# Patient Record
Sex: MALE | Employment: OTHER | URBAN - METROPOLITAN AREA
[De-identification: names, ages, dates, MRNs, and addresses within clinical notes are randomized per-mention and may not be internally consistent; named-entity substitution may affect disease eponyms.]

---

## 2017-07-20 ENCOUNTER — TRANSFERRED RECORDS (OUTPATIENT)
Dept: HEALTH INFORMATION MANAGEMENT | Facility: CLINIC | Age: 82
End: 2017-07-20

## 2017-08-23 ENCOUNTER — OFFICE VISIT (OUTPATIENT)
Dept: UROLOGY | Facility: CLINIC | Age: 82
End: 2017-08-23
Payer: MEDICARE

## 2017-08-23 VITALS
SYSTOLIC BLOOD PRESSURE: 92 MMHG | HEART RATE: 80 BPM | WEIGHT: 176 LBS | BODY MASS INDEX: 24.64 KG/M2 | HEIGHT: 71 IN | DIASTOLIC BLOOD PRESSURE: 44 MMHG

## 2017-08-23 DIAGNOSIS — R31.9 HEMATURIA: Primary | ICD-10-CM

## 2017-08-23 LAB
ALBUMIN UR-MCNC: 100 MG/DL
APPEARANCE UR: ABNORMAL
BILIRUB UR QL STRIP: ABNORMAL
COLOR UR AUTO: ABNORMAL
GLUCOSE UR STRIP-MCNC: NEGATIVE MG/DL
HGB UR QL STRIP: ABNORMAL
KETONES UR STRIP-MCNC: NEGATIVE MG/DL
LEUKOCYTE ESTERASE UR QL STRIP: NEGATIVE
NITRATE UR QL: NEGATIVE
PH UR STRIP: 6.5 PH (ref 5–7)
RESIDUAL VOLUME (RV) (EXTERNAL): 500
SOURCE: ABNORMAL
SP GR UR STRIP: 1.01 (ref 1–1.03)
UROBILINOGEN UR STRIP-ACNC: 0.2 EU/DL (ref 0.2–1)

## 2017-08-23 PROCEDURE — 51798 US URINE CAPACITY MEASURE: CPT | Performed by: UROLOGY

## 2017-08-23 PROCEDURE — 81003 URINALYSIS AUTO W/O SCOPE: CPT | Performed by: UROLOGY

## 2017-08-23 PROCEDURE — 51701 INSERT BLADDER CATHETER: CPT | Performed by: UROLOGY

## 2017-08-23 PROCEDURE — 99203 OFFICE O/P NEW LOW 30 MIN: CPT | Mod: 25 | Performed by: UROLOGY

## 2017-08-23 RX ORDER — AMPICILLIN TRIHYDRATE 500 MG
CAPSULE ORAL DAILY
COMMUNITY
Start: 2009-06-11

## 2017-08-23 RX ORDER — CALCIUM CARBONATE 500(1250)
1 TABLET ORAL 2 TIMES DAILY
COMMUNITY
End: 2017-09-06

## 2017-08-23 RX ORDER — IBUPROFEN 200 MG
200 TABLET ORAL EVERY 6 HOURS PRN
Status: ON HOLD | COMMUNITY
Start: 2008-05-22 | End: 2017-09-07

## 2017-08-23 RX ORDER — PRAVASTATIN SODIUM 10 MG
10 TABLET ORAL AT BEDTIME
Refills: 2 | COMMUNITY
Start: 2017-08-15

## 2017-08-23 ASSESSMENT — PAIN SCALES - GENERAL: PAINLEVEL: NO PAIN (0)

## 2017-08-23 NOTE — NURSING NOTE
Chief Complaint   Patient presents with     Hematuria     Patient is here for blood in his urine that he can see.     Freddie Parrish LPN 4:00 PM August 23, 2017

## 2017-08-23 NOTE — MR AVS SNAPSHOT
After Visit Summary   8/23/2017    Mio Delacruz    MRN: [id number]           Patient Information     Date Of Birth          1/4/1927        Visit Information        Provider Department      8/23/2017 3:50 PM Agusto Iverson MD [facility] Urology [facility]        Today's Diagnoses     Hematuria    -  1       Follow-ups after your visit        Your next 10 appointments already scheduled     Aug 25, 2017 11:30 AM CDT   CT ABDOMEN PELVIS W/O & W CONTRAST with SHCT1   [facility] ([facility])    [address] [place] [address]   [phone]           Please bring any scans or X-rays taken at other hospitals, if similar tests were done. Also bring a list of your medicines, including vitamins, minerals and over-the-counter drugs. It is safest to leave personal items at home.  Be sure to tell your doctor:   If you have any allergies.   If there s any chance you are pregnant.   If you are breastfeeding.   If you have any special needs.  You may have contrast for this exam. To prepare:   Do not eat or drink for 2 hours before your exam. If you need to take medicine, you may take it with small sips of water. (We may ask you to take liquid medicine as well.)   The day before your exam, drink extra fluids at least six 8-ounce glasses (unless your doctor tells you to restrict your fluids).  Patients over 70 or patients with diabetes or kidney problems:   If you haven t had a blood test (creatinine test) within the last 30 days, go to your clinic or Diagnostic Imaging Department for this test.  If you have diabetes:   If your kidney function is normal, continue taking your metformin (Avandamet, Glucophage, Glucovance, Metaglip) on the day of your exam.   If your kidney function is abnormal, wait 48 hours before restarting this medicine.  You will have oral contrast for this exam:   You will drink the contrast at home. Get this from your  "clinic or Diagnostic Imaging Department. Please follow the directions given.  Please wear loose clothing, such as a sweat suit or jogging clothes. Avoid snaps, zippers and other metal. We may ask you to undress and put on a hospital gown.  If you have any questions, please call the Imaging Department where you will have your exam.            Sep 06, 2017 11:00 AM CDT   Cystoscopy with Agusto Iverson MD, UB CYF   [facility] ([facility])    [address]  [address]  [facility] [address]   [phone]              Future tests that were ordered for you today     Open Future Orders        Priority Expected Expires Ordered    Creatinine [LAB66] Routine  8/23/2018 8/23/2017    CT Abdomen Pelvis w/o & w Contrast [BSY731] Routine  8/23/2018 8/23/2017            Who to contact     If you have questions or need follow up information about today's clinic visit or your schedule please contact [facility]OLOG[facility] CLIN[facility] [place] directly at [phone].  Normal or non-critical lab and imaging results will be communicated to you by Iscopia Softwarehart, letter or phone within 4 business days after the clinic has received the results. If you do not hear from us within 7 days, please contact the clinic through [employer] or phone. If you have a critical or abnormal lab result, we will notify you by phone as soon as possible.  Submit refill requests through ALKALINE WATER or call your pharmacy and they will forward the refill request to us. Please allow 3 business days for your refill to be completed.          Additional Information About Your Visit        ALKALINE WATER Information     ALKALINE WATER lets you send messages to your doctor, view your test results, renew your prescriptions, schedule appointments and more. To sign up, go to [email or web address] . Click on \"Log in\" on the left side of the screen, which will take you to the Welcome page. Then click on \"Sign up Now\" on the " "right side of the page.     You will be asked to enter the access code listed below, as well as some personal information. Please follow the directions to create your username and password.     Your access code is: [email or web address]  Expires: 2017  4:45 PM     Your access code will  in 90 days. If you need help or a new code, please call your [facility] or [phone].        Care EveryWhere ID     This is your Care EveryWhere ID. This could be used by other organizations to access your [place] medical records  [id number]        Your Vitals Were     Pulse Height BMI (Body Mass Index)             80 1.803 m (5' 11\") 24.55 kg/m2          Blood Pressure from Last 3 Encounters:   17 92/44    Weight from Last 3 Encounters:   17 79.8 kg (176 lb)              We Performed the Following     INSERT BLADDER CATH, TEMP INDWELL SIMPLE ([place]) (54270)     MEASURE POST-VOID RESIDUAL URINE/BLADDER CAPACITY, US NON-IMAGING     UA without Microscopic        Primary Care Provider    None Specified       No primary provider on file.        Equal Access to Services     [facility]: Hadii jalen Chapman, wabud farley, qaybjyothi quezadaalaristeo packer, lenore hdz . So [facility] [phone].    ATENCIÓN: Si habla español, tiene a jackson disposición servicios gratuitos de asistencia lingüística. Llame al [phone].    We comply with applicable federal civil rights laws and [place] laws. We do not discriminate on the basis of race, color, national origin, age, disability sex, sexual orientation or gender identity.            Thank you!     Thank you for choosing [facility] [place]  for your care. Our goal is always to provide you with excellent care. Hearing back from our patients is one way we can continue to improve our services. Please take a few minutes to complete the written survey that you may receive in the mail after your visit with us. " Thank you!             Your Updated Medication List - Protect others around you: Learn how to safely use, store and throw away your medicines at www.disposemymeds.org.          This list is accurate as of: 8/23/17  4:56 PM.  Always use your most recent med list.                   Brand Name Dispense Instructions for use Diagnosis    calcium carbonate 1250 MG tablet    OS-LEVY 500 mg [place]. Ca     Take 1 tablet by mouth 2 times daily        D 1000 1000 UNITS Caps           ibuprofen 200 MG tablet    ADVIL/MOTRIN          pravastatin 10 MG tablet    PRAVACHOL

## 2017-08-23 NOTE — LETTER
8/23/2017       RE: Mio Delacruz  [address]  [place] [address]     Dear Colleague,    Thank you for referring your patient, Mio Delacruz, to the [facility]OLOG[facility] [place] at [facility]. Please see a copy of my visit note below.    Mio Delacruz is a 89-year-old gentleman who underwent radiation therapy for prostate cancer 10 years ago. I have not seen him in the last 6 years. Apparently his PSA is less than 0.5 on a regular basis at [facility]. He began having hematuria in the last 24 hours and noticed a few small clots passed. He feels he is emptying his bladder well. He's had no flank pain, fevers, chills, dysuria.  Postvoid residual today is 600 cc with Rosales catheter placement. His urine is grossly bloody  Other past medical history: Nonsmoker, TUNA procedure for BPH.  Medications: Calcium carbonate, vitamin D, ibuprofen, Pravachol  Allergies penicillin  Exam: Normal appearance, normal vital signs, alert and oriented, normocephalic, normal respirations, neuro grossly intact.  22 Danish Rosales placed sterilely and bladder emptied for 600 cc of grossly bloody urine. Bladder irrigated with no clots. Rosales catheter removed.  Assessment: Gross hematuria, radiation therapy for prostate cancer, partial urinary retention  Plan: CT scan, serum creatinine, office cystoscopy, digital rectal exam. Stop ibuprofen. No aspirin    Again, thank you for allowing me to participate in the care of your patient.      Sincerely,    Agusto Iverson MD

## 2017-08-23 NOTE — PROGRESS NOTES
Mio Delacruz is a 89-year-old gentleman who underwent radiation therapy for prostate cancer 10 years ago. I have not seen him in the last 6 years. Apparently his PSA is less than 0.5 on a regular basis at [facility]. He began having hematuria in the last 24 hours and noticed a few small clots passed. He feels he is emptying his bladder well. He's had no flank pain, fevers, chills, dysuria.  Postvoid residual today is 600 cc with Rosales catheter placement. His urine is grossly bloody  Other past medical history: Nonsmoker, TUNA procedure for BPH.  Medications: Calcium carbonate, vitamin D, ibuprofen, Pravachol  Allergies penicillin  Exam: Normal appearance, normal vital signs, alert and oriented, normocephalic, normal respirations, neuro grossly intact.  22 Croatian Rosales placed sterilely and bladder emptied for 600 cc of grossly bloody urine. Bladder irrigated with no clots. Rosales catheter removed.  Assessment: Gross hematuria, radiation therapy for prostate cancer, partial urinary retention  Plan: CT scan, serum creatinine, office cystoscopy, digital rectal exam. Stop ibuprofen. No aspirin

## 2017-08-23 NOTE — LETTER
8/23/2017      RE: Mio Delacruz  [address]  [place] [address]       Mio Delacruz is a 89-year-old gentleman who underwent radiation therapy for prostate cancer 10 years ago. I have not seen him in the last 6 years. Apparently his PSA is less than 0.5 on a regular basis at [facility]. He began having hematuria in the last 24 hours and noticed a few small clots passed. He feels he is emptying his bladder well. He's had no flank pain, fevers, chills, dysuria.  Postvoid residual today is 600 cc with Rosales catheter placement. His urine is grossly bloody  Other past medical history: Nonsmoker, TUNA procedure for BPH.  Medications: Calcium carbonate, vitamin D, ibuprofen, Pravachol  Allergies penicillin  Exam: Normal appearance, normal vital signs, alert and oriented, normocephalic, normal respirations, neuro grossly intact.  22 Albanian Rosales placed sterilely and bladder emptied for 600 cc of grossly bloody urine. Bladder irrigated with no clots. Rosales catheter removed.  Assessment: Gross hematuria, radiation therapy for prostate cancer, partial urinary retention  Plan: CT scan, serum creatinine, office cystoscopy, digital rectal exam. Stop ibuprofen. No aspirin    Agusto Iverson MD

## 2017-08-25 ENCOUNTER — HOSPITAL ENCOUNTER (OUTPATIENT)
Dept: CT IMAGING | Facility: CLINIC | Age: 82
Discharge: HOME | End: 2017-08-25
Attending: UROLOGY | Admitting: UROLOGY
Payer: MEDICARE

## 2017-08-25 DIAGNOSIS — R31.9 HEMATURIA: ICD-10-CM

## 2017-08-25 LAB
CREAT BLD-MCNC: 1 MG/DL (ref 0.66–1.25)
GFR SERPL CREATININE-BSD FRML MDRD: 70 ML/MIN/1.7M2

## 2017-08-25 PROCEDURE — 74178 CT ABD&PLV WO CNTR FLWD CNTR: CPT

## 2017-08-25 PROCEDURE — 82565 ASSAY OF CREATININE: CPT

## 2017-08-25 PROCEDURE — 25000128 H RX IP 250 OP 636: Performed by: UROLOGY

## 2017-08-25 RX ORDER — IOPAMIDOL 755 MG/ML
100 INJECTION, SOLUTION INTRAVASCULAR ONCE
Status: COMPLETED | OUTPATIENT
Start: 2017-08-25 | End: 2017-08-25

## 2017-08-25 RX ADMIN — SODIUM CHLORIDE 60 ML: 9 INJECTION, SOLUTION INTRAVENOUS at 11:53

## 2017-08-25 RX ADMIN — IOPAMIDOL 100 ML: 755 INJECTION, SOLUTION INTRAVENOUS at 11:53

## 2017-08-29 ENCOUNTER — TELEPHONE (OUTPATIENT)
Dept: UROLOGY | Facility: CLINIC | Age: 82
End: 2017-08-29

## 2017-08-29 NOTE — TELEPHONE ENCOUNTER
Pt calling and stating he is still seeing blood in his urine.  Pt also wants his CT results.  I reminded him he has an appt on the 6th of sept and results will given to him then and have a cysto.  I also told him I would send [facility] a message to let [facility] know he wants his CT results.  Pt would like his results.  Please call the pt with CT results.  WALT Schulte, CMA

## 2017-09-01 NOTE — TELEPHONE ENCOUNTER
Agusto Iverson MD  P [facility] Nurse-Apurva JEAN-BAPTISTE to inform that CT looks fine              Pt notified.  Pt will see [facility] on Wednesday.  WALT Schulte CMA

## 2017-09-06 ENCOUNTER — OFFICE VISIT (OUTPATIENT)
Dept: UROLOGY | Facility: CLINIC | Age: 82
End: 2017-09-06
Payer: MEDICARE

## 2017-09-06 VITALS
BODY MASS INDEX: 24.64 KG/M2 | DIASTOLIC BLOOD PRESSURE: 60 MMHG | WEIGHT: 176 LBS | SYSTOLIC BLOOD PRESSURE: 116 MMHG | OXYGEN SATURATION: 98 % | HEART RATE: 84 BPM | HEIGHT: 71 IN

## 2017-09-06 DIAGNOSIS — R31.9 HEMATURIA: Primary | ICD-10-CM

## 2017-09-06 LAB
ALBUMIN UR-MCNC: 100 MG/DL
APPEARANCE UR: ABNORMAL
BILIRUB UR QL STRIP: ABNORMAL
COLOR UR AUTO: ABNORMAL
GLUCOSE UR STRIP-MCNC: NEGATIVE MG/DL
HGB UR QL STRIP: ABNORMAL
KETONES UR STRIP-MCNC: NEGATIVE MG/DL
LEUKOCYTE ESTERASE UR QL STRIP: NEGATIVE
NITRATE UR QL: NEGATIVE
PH UR STRIP: 7 PH (ref 5–7)
SOURCE: ABNORMAL
SP GR UR STRIP: 1.01 (ref 1–1.03)
UROBILINOGEN UR STRIP-ACNC: 1 EU/DL (ref 0.2–1)

## 2017-09-06 PROCEDURE — 99212 OFFICE O/P EST SF 10 MIN: CPT | Mod: 25 | Performed by: UROLOGY

## 2017-09-06 PROCEDURE — 52000 CYSTOURETHROSCOPY: CPT | Performed by: UROLOGY

## 2017-09-06 PROCEDURE — 81003 URINALYSIS AUTO W/O SCOPE: CPT | Performed by: UROLOGY

## 2017-09-06 RX ORDER — CHLORHEXIDINE GLUCONATE 40 MG/ML
SOLUTION TOPICAL ONCE
Status: CANCELLED | OUTPATIENT
Start: 2017-09-06 | End: 2017-09-06

## 2017-09-06 RX ORDER — CIPROFLOXACIN 500 MG/1
500 TABLET, FILM COATED ORAL ONCE
Qty: 1 TABLET | Refills: 0 | Status: ON HOLD | OUTPATIENT
Start: 2017-09-06 | End: 2017-09-07

## 2017-09-06 ASSESSMENT — PAIN SCALES - GENERAL: PAINLEVEL: NO PAIN (0)

## 2017-09-06 NOTE — LETTER
9/6/2017       RE: Mio Delacruz  [address]  [place] [address]     Dear Colleague,    Thank you for referring your patient, Mio Delacruz, to the [facility] [place] at [facility] Please see a copy of my visit note below.    Mio Delacruz is a 89-year-old gentleman with a history of grade 3+3 adenocarcinoma the prostate that was treated with radiation 10 years ago. He's had gross hematuria recently and has a normal-appearing CT scan of the limited trabeculated bladder. He also has a 600 cc postvoid residual despite previous microwave thermotherapy. He returns today for cystoscopy.  Flexible cystoscopy-normal urethra, open prostatic fossa and bladder neck, 4+ trabeculation of bladder wall, single papillary tumor and posterior bladder wall consistent with superficial TCC bladder-discussed.  Bladder emptied before cystoscopy for 525 cc.  Assessment: Transitional cell carcinoma bladder with hematuria, history of prostate cancer status post radiation  Plan: Video cystoscopy, EUA, cup biopsy of bladder tumor and fulguration tomorrow as outpatient. Patient had complete physical exam at [facility] last month.  N.p.o. after midnight. Do not take calcium carbonate tomorrow. Take Cipro 500 mg ×1 with small sip of water in the morning    Again, thank you for allowing me to participate in the care of your patient.      Sincerely,    Agusto Iverson MD

## 2017-09-06 NOTE — LETTER
Date:September 11, 2017      Patient was self referred, no letter generated. Do not send.        [facility] Information

## 2017-09-06 NOTE — PROGRESS NOTES
Mio Delacruz is a 89-year-old gentleman with a history of grade 3+3 adenocarcinoma the prostate that was treated with radiation 10 years ago. He's had gross hematuria recently and has a normal-appearing CT scan of the limited trabeculated bladder. He also has a 600 cc postvoid residual despite previous microwave thermotherapy. He returns today for cystoscopy.  Flexible cystoscopy-normal urethra, open prostatic fossa and bladder neck, 4+ trabeculation of bladder wall, single papillary tumor and posterior bladder wall consistent with superficial TCC bladder-discussed.  Bladder emptied before cystoscopy for 525 cc.  Assessment: Transitional cell carcinoma bladder with hematuria, history of prostate cancer status post radiation  Plan: Video cystoscopy, EUA, cup biopsy of bladder tumor and fulguration tomorrow as outpatient. Patient had complete physical exam at [facility] last month.  N.p.o. after midnight. Do not take calcium carbonate tomorrow. Take Cipro 500 mg ×1 with small sip of water in the morning

## 2017-09-06 NOTE — NURSING NOTE

## 2017-09-06 NOTE — MR AVS SNAPSHOT
"              After Visit Summary   9/6/2017    Mio Delacruz    MRN: [id number]           Patient Information     Date Of Birth          1/4/1927        Visit Information        Provider Department      9/6/2017 11:00 AM Agusto Iverson MD; UA CYF [facility] [place]        Today's Diagnoses     Hematuria    -  1       Follow-ups after your visit        Future tests that were ordered for you today     Open Future Orders        Priority Expected Expires Ordered    Jennifer-Operative Worksheet  (Urology General) Routine  9/6/2018 9/6/2017    UA without Microscopic Routine  9/6/2018 9/6/2017            Who to contact     If you have questions or need follow up information about today's clinic visit or your schedule please contact [facility] [place] directly at [phone].  Normal or non-critical lab and imaging results will be communicated to you by MyChart, letter or phone within 4 business days after the clinic has received the results. If you do not hear from us within 7 days, please contact the clinic through Empathicahart or phone. If you have a critical or abnormal lab result, we will notify you by phone as soon as possible.  Submit refill requests through [employer] Plan or call your pharmacy and they will forward the refill request to us. Please allow 3 business days for your refill to be completed.          Additional Information About Your Visit        MyChart Information     [employer] Plan lets you send messages to your doctor, view your test results, renew your prescriptions, schedule appointments and more. To sign up, go to [email or web address] . Click on \"Log in\" on the left side of the screen, which will take you to the Welcome page. Then click on \"Sign up Now\" on the right side of the page.     You will be asked to enter the access code listed below, as well as some personal information. Please follow the directions to create your username and " "password.     Your access code is: [email or web address]  Expires: 2017  4:45 PM     Your access code will  in 90 days. If you need help or a new code, please call your [place] clinic or [phone].        Care EveryWhere ID     This is your Care EveryWhere ID. This could be used by other organizations to access your [place] medical records  [id number]        Your Vitals Were     Pulse Height Pulse Oximetry BMI (Body Mass Index)          84 1.803 m (5' 11\") 98% 24.55 kg/m2         Blood Pressure from Last 3 Encounters:   17 116/60   17 92/44    Weight from Last 3 Encounters:   17 79.8 kg (176 lb)   17 79.8 kg (176 lb)              We Performed the Following     CYSTOURETHROSCOPY (21925)     UA without Microscopic          Today's Medication Changes          These changes are accurate as of: 17 11:29 AM.  If you have any questions, ask your nurse or doctor.               Start taking these medicines.        Dose/Directions    ciprofloxacin 500 MG tablet   Commonly known as:  CIPRO   Used for:  Hematuria   Started by:  Agusto Iverson MD        Dose:  500 mg   Take 1 tablet (500 mg) by mouth once for 1 dose   Quantity:  1 tablet   Refills:  0            Where to get your medicines      These medications were sent to [facility] [place], [place] - [address]  [address], [place] [address]     Phone:  [phone]     ciprofloxacin 500 MG tablet                Primary Care Provider Office Phone # Fax #    Dick Nuñez [phone]       [facility] [address]  [facility] [address]        Equal Access to Services     [facility]: Lali Chapman, reinier farley, lenore turner. So [facility] [phone].    ATENCIÓN: Si habla español, tiene a jackson disposición servicios gratuitos de asistencia lingüística. Llame al [phone].    We comply with " applicable federal civil rights laws and [place] laws. We do not discriminate on the basis of race, color, national origin, age, disability sex, sexual orientation or gender identity.            Thank you!     Thank you for choosing [facility] [place]  for your care. Our goal is always to provide you with excellent care. Hearing back from our patients is one way we can continue to improve our services. Please take a few minutes to complete the written survey that you may receive in the mail after your visit with us. Thank you!             Your Updated Medication List - Protect others around you: Learn how to safely use, store and throw away your medicines at www.disposemymeds.org.          This list is accurate as of: 9/6/17 11:29 AM.  Always use your most recent med list.                   Brand Name Dispense Instructions for use Diagnosis    calcium carbonate 1250 MG tablet    OS-LEVY 500 mg [place]. Ca     Take 1 tablet by mouth 2 times daily        ciprofloxacin 500 MG tablet    CIPRO    1 tablet    Take 1 tablet (500 mg) by mouth once for 1 dose    Hematuria       D 1000 1000 UNITS Caps           ibuprofen 200 MG tablet    ADVIL/MOTRIN          pravastatin 10 MG tablet    PRAVACHOL

## 2017-09-07 ENCOUNTER — SURGERY (OUTPATIENT)
Age: 82
End: 2017-09-07

## 2017-09-07 ENCOUNTER — HOSPITAL ENCOUNTER (OUTPATIENT)
Facility: CLINIC | Age: 82
Discharge: HOME | End: 2017-09-07
Attending: UROLOGY | Admitting: UROLOGY
Payer: MEDICARE

## 2017-09-07 ENCOUNTER — ANESTHESIA (OUTPATIENT)
Dept: SURGERY | Facility: CLINIC | Age: 82
End: 2017-09-07
Payer: MEDICARE

## 2017-09-07 ENCOUNTER — ANESTHESIA EVENT (OUTPATIENT)
Dept: SURGERY | Facility: CLINIC | Age: 82
End: 2017-09-07
Payer: MEDICARE

## 2017-09-07 VITALS
OXYGEN SATURATION: 99 % | RESPIRATION RATE: 16 BRPM | SYSTOLIC BLOOD PRESSURE: 158 MMHG | HEIGHT: 70 IN | BODY MASS INDEX: 26.08 KG/M2 | TEMPERATURE: 98.2 F | WEIGHT: 182.2 LBS | DIASTOLIC BLOOD PRESSURE: 76 MMHG

## 2017-09-07 DIAGNOSIS — C67.4 MALIGNANT NEOPLASM OF POSTERIOR WALL OF URINARY BLADDER (H): Primary | ICD-10-CM

## 2017-09-07 DIAGNOSIS — R31.9 HEMATURIA: ICD-10-CM

## 2017-09-07 PROCEDURE — 37000008 ZZH ANESTHESIA TECHNICAL FEE, 1ST 30 MIN: Performed by: UROLOGY

## 2017-09-07 PROCEDURE — 71000013 ZZH RECOVERY PHASE 1 LEVEL 1 EA ADDTL HR: Performed by: UROLOGY

## 2017-09-07 PROCEDURE — 52224 CYSTOSCOPY AND TREATMENT: CPT | Performed by: UROLOGY

## 2017-09-07 PROCEDURE — 25000566 ZZH SEVOFLURANE, EA 15 MIN: Performed by: UROLOGY

## 2017-09-07 PROCEDURE — 27210794 ZZH OR GENERAL SUPPLY STERILE: Performed by: UROLOGY

## 2017-09-07 PROCEDURE — 25000128 H RX IP 250 OP 636: Performed by: ANESTHESIOLOGY

## 2017-09-07 PROCEDURE — 88305 TISSUE EXAM BY PATHOLOGIST: CPT | Mod: 26 | Performed by: UROLOGY

## 2017-09-07 PROCEDURE — 37000009 ZZH ANESTHESIA TECHNICAL FEE, EACH ADDTL 15 MIN: Performed by: UROLOGY

## 2017-09-07 PROCEDURE — 93010 ELECTROCARDIOGRAM REPORT: CPT | Performed by: INTERNAL MEDICINE

## 2017-09-07 PROCEDURE — 71000027 ZZH RECOVERY PHASE 2 EACH 15 MINS: Performed by: UROLOGY

## 2017-09-07 PROCEDURE — 25000125 ZZHC RX 250: Performed by: ANESTHESIOLOGY

## 2017-09-07 PROCEDURE — 88305 TISSUE EXAM BY PATHOLOGIST: CPT | Performed by: UROLOGY

## 2017-09-07 PROCEDURE — 25000128 H RX IP 250 OP 636: Performed by: NURSE ANESTHETIST, CERTIFIED REGISTERED

## 2017-09-07 PROCEDURE — 71000012 ZZH RECOVERY PHASE 1 LEVEL 1 FIRST HR: Performed by: UROLOGY

## 2017-09-07 PROCEDURE — 40000306 ZZH STATISTIC PRE PROC ASSESS II: Performed by: UROLOGY

## 2017-09-07 PROCEDURE — 36000050 ZZH SURGERY LEVEL 2 1ST 30 MIN: Performed by: UROLOGY

## 2017-09-07 PROCEDURE — 27210995 ZZH RX 272: Performed by: UROLOGY

## 2017-09-07 RX ORDER — ONDANSETRON 2 MG/ML
4 INJECTION INTRAMUSCULAR; INTRAVENOUS EVERY 30 MIN PRN
Status: DISCONTINUED | OUTPATIENT
Start: 2017-09-07 | End: 2017-09-07 | Stop reason: HOSPADM

## 2017-09-07 RX ORDER — PROPOFOL 10 MG/ML
INJECTION, EMULSION INTRAVENOUS PRN
Status: DISCONTINUED | OUTPATIENT
Start: 2017-09-07 | End: 2017-09-07

## 2017-09-07 RX ORDER — LABETALOL HYDROCHLORIDE 5 MG/ML
10 INJECTION, SOLUTION INTRAVENOUS ONCE
Status: DISCONTINUED | OUTPATIENT
Start: 2017-09-07 | End: 2017-09-07 | Stop reason: HOSPADM

## 2017-09-07 RX ORDER — CIPROFLOXACIN 500 MG/1
500 TABLET, FILM COATED ORAL EVERY 12 HOURS
Qty: 2 TABLET | Refills: 0 | Status: SHIPPED | OUTPATIENT
Start: 2017-09-07 | End: 2017-12-13

## 2017-09-07 RX ORDER — ONDANSETRON 2 MG/ML
INJECTION INTRAMUSCULAR; INTRAVENOUS PRN
Status: DISCONTINUED | OUTPATIENT
Start: 2017-09-07 | End: 2017-09-07

## 2017-09-07 RX ORDER — DEXAMETHASONE SODIUM PHOSPHATE 4 MG/ML
INJECTION, SOLUTION INTRA-ARTICULAR; INTRALESIONAL; INTRAMUSCULAR; INTRAVENOUS; SOFT TISSUE PRN
Status: DISCONTINUED | OUTPATIENT
Start: 2017-09-07 | End: 2017-09-07

## 2017-09-07 RX ORDER — MEPERIDINE HYDROCHLORIDE 25 MG/ML
12.5 INJECTION INTRAMUSCULAR; INTRAVENOUS; SUBCUTANEOUS
Status: DISCONTINUED | OUTPATIENT
Start: 2017-09-07 | End: 2017-09-07 | Stop reason: HOSPADM

## 2017-09-07 RX ORDER — SODIUM CHLORIDE, SODIUM LACTATE, POTASSIUM CHLORIDE, CALCIUM CHLORIDE 600; 310; 30; 20 MG/100ML; MG/100ML; MG/100ML; MG/100ML
INJECTION, SOLUTION INTRAVENOUS CONTINUOUS
Status: DISCONTINUED | OUTPATIENT
Start: 2017-09-07 | End: 2017-09-07 | Stop reason: HOSPADM

## 2017-09-07 RX ORDER — CHLORHEXIDINE GLUCONATE 40 MG/ML
SOLUTION TOPICAL ONCE
Status: DISCONTINUED | OUTPATIENT
Start: 2017-09-07 | End: 2017-09-07 | Stop reason: HOSPADM

## 2017-09-07 RX ORDER — CIPROFLOXACIN 500 MG/1
500 TABLET, FILM COATED ORAL ONCE
Qty: 1 TABLET | Refills: 0 | Status: SHIPPED | OUTPATIENT
Start: 2017-09-07 | End: 2017-09-07

## 2017-09-07 RX ORDER — FENTANYL CITRATE 50 UG/ML
INJECTION, SOLUTION INTRAMUSCULAR; INTRAVENOUS PRN
Status: DISCONTINUED | OUTPATIENT
Start: 2017-09-07 | End: 2017-09-07

## 2017-09-07 RX ORDER — HYDRALAZINE HYDROCHLORIDE 20 MG/ML
2.5-5 INJECTION INTRAMUSCULAR; INTRAVENOUS EVERY 10 MIN PRN
Status: DISCONTINUED | OUTPATIENT
Start: 2017-09-07 | End: 2017-09-07 | Stop reason: HOSPADM

## 2017-09-07 RX ORDER — LIDOCAINE 40 MG/G
CREAM TOPICAL
Status: DISCONTINUED | OUTPATIENT
Start: 2017-09-07 | End: 2017-09-07 | Stop reason: HOSPADM

## 2017-09-07 RX ORDER — NALOXONE HYDROCHLORIDE 0.4 MG/ML
.1-.4 INJECTION, SOLUTION INTRAMUSCULAR; INTRAVENOUS; SUBCUTANEOUS
Status: DISCONTINUED | OUTPATIENT
Start: 2017-09-07 | End: 2017-09-07 | Stop reason: HOSPADM

## 2017-09-07 RX ORDER — HYDROMORPHONE HYDROCHLORIDE 1 MG/ML
.3-.5 INJECTION, SOLUTION INTRAMUSCULAR; INTRAVENOUS; SUBCUTANEOUS EVERY 10 MIN PRN
Status: DISCONTINUED | OUTPATIENT
Start: 2017-09-07 | End: 2017-09-07 | Stop reason: HOSPADM

## 2017-09-07 RX ORDER — FENTANYL CITRATE 50 UG/ML
25-50 INJECTION, SOLUTION INTRAMUSCULAR; INTRAVENOUS
Status: DISCONTINUED | OUTPATIENT
Start: 2017-09-07 | End: 2017-09-07 | Stop reason: HOSPADM

## 2017-09-07 RX ORDER — FENTANYL CITRATE 50 UG/ML
25-50 INJECTION, SOLUTION INTRAMUSCULAR; INTRAVENOUS EVERY 5 MIN PRN
Status: DISCONTINUED | OUTPATIENT
Start: 2017-09-07 | End: 2017-09-07 | Stop reason: HOSPADM

## 2017-09-07 RX ORDER — ONDANSETRON 4 MG/1
4 TABLET, ORALLY DISINTEGRATING ORAL EVERY 30 MIN PRN
Status: DISCONTINUED | OUTPATIENT
Start: 2017-09-07 | End: 2017-09-07 | Stop reason: HOSPADM

## 2017-09-07 RX ADMIN — WATER 2000 ML: 100 IRRIGANT IRRIGATION at 15:53

## 2017-09-07 RX ADMIN — DEXAMETHASONE SODIUM PHOSPHATE 4 MG: 4 INJECTION, SOLUTION INTRA-ARTICULAR; INTRALESIONAL; INTRAMUSCULAR; INTRAVENOUS; SOFT TISSUE at 15:41

## 2017-09-07 RX ADMIN — PROPOFOL 30 MG: 10 INJECTION, EMULSION INTRAVENOUS at 15:42

## 2017-09-07 RX ADMIN — PROPOFOL 80 MG: 10 INJECTION, EMULSION INTRAVENOUS at 15:41

## 2017-09-07 RX ADMIN — Medication 50 MG: at 15:41

## 2017-09-07 RX ADMIN — SODIUM CHLORIDE, POTASSIUM CHLORIDE, SODIUM LACTATE AND CALCIUM CHLORIDE: 600; 310; 30; 20 INJECTION, SOLUTION INTRAVENOUS at 15:51

## 2017-09-07 RX ADMIN — HYDRALAZINE HYDROCHLORIDE 5 MG: 20 INJECTION INTRAMUSCULAR; INTRAVENOUS at 17:30

## 2017-09-07 RX ADMIN — HYDRALAZINE HYDROCHLORIDE 5 MG: 20 INJECTION INTRAMUSCULAR; INTRAVENOUS at 17:10

## 2017-09-07 RX ADMIN — ONDANSETRON 4 MG: 2 INJECTION INTRAMUSCULAR; INTRAVENOUS at 15:49

## 2017-09-07 RX ADMIN — FENTANYL CITRATE 50 MCG: 50 INJECTION, SOLUTION INTRAMUSCULAR; INTRAVENOUS at 15:48

## 2017-09-07 RX ADMIN — FENTANYL CITRATE 50 MCG: 50 INJECTION, SOLUTION INTRAMUSCULAR; INTRAVENOUS at 15:41

## 2017-09-07 RX ADMIN — SODIUM CHLORIDE, POTASSIUM CHLORIDE, SODIUM LACTATE AND CALCIUM CHLORIDE: 600; 310; 30; 20 INJECTION, SOLUTION INTRAVENOUS at 15:35

## 2017-09-07 NOTE — ANESTHESIA POSTPROCEDURE EVALUATION
Patient: Mio Delacruz    Procedure(s):  Video Cystoscopy with CUP biopsies of a bladdar tumor  - Wound Class: II-Clean Contaminated    Diagnosis:Bladder tumor   Diagnosis Additional Information: Bladder tumor     Anesthesia Type:  General, LMA    Note:  Anesthesia Post Evaluation    Patient location during evaluation: PACU  Patient participation: Able to fully participate in evaluation  Level of consciousness: awake and alert  Pain management: adequate  Airway patency: patent  Cardiovascular status: acceptable  Respiratory status: acceptable  Hydration status: acceptable  PONV: controlled     Anesthetic complications: None          Last vitals:  Vitals:    09/07/17 1645 09/07/17 1700 09/07/17 1710   BP: 145/76 175/71 179/69   Resp: 20 14    Temp: 98.2  F (36.8  C)     SpO2:            Electronically Signed By: Corey Askew MD  September 7, 2017  5:20 PM

## 2017-09-07 NOTE — IP AVS SNAPSHOT
MRN:[id number]                      After Visit Summary   9/7/2017    Mio Delacruz    MRN: [id number]           Thank you!     Thank you for choosing [facility] for your care. Our goal is always to provide you with excellent care. Hearing back from our patients is one way we can continue to improve our services. Please take a few minutes to complete the written survey that you may receive in the mail after you visit. If you would like to speak to someone directly about your visit please contact Patient Relations at [phone]. Thank you!          Patient Information     Date Of Birth          1/4/1927        About your hospital stay     You were admitted on:  September 7, 2017 You last received care in the:  [facility]    You were discharged on:  September 7, 2017       Who to Call     For medical emergencies, please call 221.  For non-urgent questions about your medical care, please call your primary care provider or clinic, [phone]  For questions related to your surgery, please call your surgery clinic        Attending Provider     Provider Specialty    Agusto Iverson MD Urology       Primary Care Provider Office Phone # Fax #    Dick Nuñez [phone]      Your next 10 appointments already scheduled     Dec 13, 2017  2:00 PM CST   Cystoscopy with Agusto Iverson MD, [facility] ([facility])    [address]  [address]  [facility] [id number] [phone]              Further instructions from your care team       CYSTOSCOPY DISCHARGE INSTRUCTIONS  [facility]  [facility]  [phone]    YOU MAY GO BACK TO YOUR NORMAL DIET AND ACTIVITY, UNLESS YOUR DOCTOR TELLS YOU NOT TO.    FOR THE NEXT TWO DAYS, YOU MAY NOTICE:    SOME BLOOD IN YOUR URINE.  SOME BURNING WHEN YOU URINATE (USE THE TOILET).  AN URGE TO URINATE MORE  OFTEN.  BLADDER SPASMS.    THESE ARE NORMAL AFTER THE PROCEDURE.  THEY SHOULD GO AWAY AFTER A DAY OR TWO.  TO RELIEVE THESE PROBLEMS:     DRINK 6 TO 8 LARGE GLASSES OF WATER EACH DAY (INCLUDES DRINKS AT MEALS).  THIS WILL HELP CLEAR THE URINE.    TAKE WARM BATHS TO RELIEVE PAIN AND BLADDER SPASMS.  DO NOT ADD ANYTHING TO THE BATH WATER.    YOUR DOCTOR MAY PRESCRIBE PAIN MEDICINE.  YOU MAY ALSO TAKE TYLENOL (ACETAMINOPHEN) FOR PAIN.    CALL YOUR SURGEON IF YOU HAVE:    A FEVER OVER 101 DEGREES.  CHECK YOUR TEMPERATURE UNDER YOUR TONGUE.    CHILLS.    FAILURE TO URINATE (NO URINE COMES OUT WHEN YOU TRY TO USE THE TOILET).  TRY SOAKING IN A BATHTUB FULL OF WARM WATER.  IF STILL NO URINE, CALL YOUR DOCTOR.    A LOT OF BLOOD IN THE URINE, OR BLOOD CLOTS LARGER THAN A NICKEL.      PAIN IN THE BACK OR BELLY AREA (ABDOMEN).    PAIN OR SPASMS THAT ARE NOT RELIEVED BY WARM TUB BATHS AND PAIN MEDICINE.      SEVERE PAIN, BURNING OR OTHER PROBLEMS WHILE PASSING URINE.    PAIN THAT GETS WORSE AFTER TWO DAYS.        GENERAL ANESTHESIA OR SEDATION ADULT DISCHARGE INSTRUCTIONS   SPECIAL PRECAUTIONS FOR 24 HOURS AFTER SURGERY    IT IS NOT UNUSUAL TO FEEL LIGHT-HEADED OR FAINT, UP TO 24 HOURS AFTER SURGERY OR WHILE TAKING PAIN MEDICATION.  IF YOU HAVE THESE SYMPTOMS; SIT FOR A FEW MINUTES BEFORE STANDING AND HAVE SOMEONE ASSIST YOU WHEN YOU GET UP TO WALK OR USE THE BATHROOM.    YOU SHOULD REST AND RELAX FOR THE NEXT 24 HOURS AND YOU MUST MAKE ARRANGEMENTS TO HAVE SOMEONE STAY WITH YOU FOR AT LEAST 24 HOURS AFTER YOUR DISCHARGE.  AVOID HAZARDOUS AND STRENUOUS ACTIVITIES.  DO NOT MAKE IMPORTANT DECISIONS FOR 24 HOURS.    DO NOT DRIVE ANY VEHICLE OR OPERATE MECHANICAL EQUIPMENT FOR 24 HOURS FOLLOWING THE END OF YOUR SURGERY.  EVEN THOUGH YOU MAY FEEL NORMAL, YOUR REACTIONS MAY BE AFFECTED BY THE MEDICATION YOU HAVE RECEIVED.    DO NOT DRINK ALCOHOLIC BEVERAGES FOR 24 HOURS FOLLOWING YOUR SURGERY.    DRINK CLEAR LIQUIDS (APPLE JUICE,  "GINGER ALE, 7-UP, BROTH, ETC.).  PROGRESS TO YOUR REGULAR DIET AS YOU FEEL ABLE.    YOU MAY HAVE A DRY MOUTH, A SORE THROAT, MUSCLES ACHES OR TROUBLE SLEEPING.  THESE SHOULD GO AWAY AFTER 24 HOURS.    CALL YOUR DOCTOR FOR ANY OF THE FOLLOWING:  SIGNS OF INFECTION (FEVER, GROWING TENDERNESS AT THE SURGERY SITE, A LARGE AMOUNT OF DRAINAGE OR BLEEDING, SEVERE PAIN, FOUL-SMELLING DRAINAGE, REDNESS OR SWELLING.    IT HAS BEEN OVER 8 TO 10 HOURS SINCE SURGERY AND YOU ARE STILL NOT ABLE TO URINATE (PASS WATER).     Pending Results     Date and Time Order Name Status Description    2017 1551 Surgical pathology exam In process             Admission Information     Date & Time Provider Department Dept. Phone    2017 Agusto Iverosn MD [facility] [phone]      Your Vitals Were     Blood Pressure Temperature Respirations Height Weight Pulse Oximetry    179/69 98.2  F (36.8  C) (Temporal) 14 1.778 m (5' 10\") 82.6 kg (182 lb 3.2 oz) 99%    BMI (Body Mass Index)                   26.14 kg/m2           MyChart Information     [employer] lets you send messages to your doctor, view your test results, renew your prescriptions, schedule appointments and more. To sign up, go to [email or web address] . Click on \"Log in\" on the left side of the screen, which will take you to the Welcome page. Then click on \"Sign up Now\" on the right side of the page.     You will be asked to enter the access code listed below, as well as some personal information. Please follow the directions to create your username and password.     Your access code is: [email or web address]  Expires: 2017  4:45 PM     Your access code will  in 90 days. If you need help or a new code, please call your [place] clinic or [phone].        Care EveryWhere ID     This is your Care EveryWhere ID. This could be used by other organizations to access your [place] medical records  [id number]        Equal Access to Services     " ISMAEL OLMSTEAD : Hadii aad ku abby Chapman, waaxda luqadaha, qaybta kaalmada mayrabethlaxmi, lenore hdz . So [facility] [phone].    ATENCIÓN: Si habla lady, tiene a jackson disposición servicios gratuitos de asistencia lingüística. Llame al [phone].    We comply with applicable federal civil rights laws and [place] laws. We do not discriminate on the basis of race, color, national origin, age, disability sex, sexual orientation or gender identity.               Review of your medicines      CONTINUE these medicines which may have CHANGED, or have new prescriptions. If we are uncertain of the size of tablets/capsules you have at home, strength may be listed as something that might have changed.        Dose / Directions    * ciprofloxacin 500 MG tablet   Commonly known as:  CIPRO   This may have changed:  Another medication with the same name was added. Make sure you understand how and when to take each.   Used for:  Hematuria        Dose:  500 mg   Take 1 tablet (500 mg) by mouth once for 1 dose   Quantity:  1 tablet   Refills:  0       * ciprofloxacin 500 MG tablet   Commonly known as:  CIPRO   This may have changed:  You were already taking a medication with the same name, and this prescription was added. Make sure you understand how and when to take each.   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 Read the directions carefully, and ask your doctor or other care provider to review them with you.      CONTINUE these medicines which have NOT CHANGED        Dose / Directions    D 1000 1000 UNITS Caps        Take by mouth daily   Refills:  0       NONFORMULARY        Chewable calcium PO daily (pt not sure of dosage).   Refills:  0        pravastatin 10 MG tablet   Commonly known as:  PRAVACHOL        Dose:  10 mg   Take 10 mg by mouth At Bedtime   Refills:  2         STOP taking     ibuprofen 200 MG tablet   Commonly known as:  ADVIL/MOTRIN                Where to get your medicines      These medications were sent to [facility] [place], [place] - [address] [address]  [address] [facility] [address]     Phone:  [phone]     ciprofloxacin 500 MG tablet         Some of these will need a paper prescription and others can be bought over the counter. Ask your nurse if you have questions.     Bring a paper prescription for each of these medications     ciprofloxacin 500 MG tablet                Protect others around you: Learn how to safely use, store and throw away your medicines at www.disposemymeds.org.             Medication List: This is a list of all your medications and when to take them. Check marks below indicate your daily home schedule. Keep this list as a reference.      Medications           Morning Afternoon Evening Bedtime As Needed    * ciprofloxacin 500 MG tablet   Commonly known as:  CIPRO   Take 1 tablet (500 mg) by mouth once for 1 dose                                * ciprofloxacin 500 MG tablet   Commonly known as:  CIPRO   Take 1 tablet (500 mg) by mouth every 12 hours                                D 1000 1000 UNITS Caps   Take by mouth daily                                NONFORMULARY   Chewable calcium PO daily (pt not sure of dosage).                                pravastatin 10 MG tablet   Commonly known as:  PRAVACHOL   Take 10 mg by mouth At Bedtime                                * Notice:  This list has 2 medication(s) that are the same as other medications prescribed for you. Read the directions carefully, and ask your doctor or other care provider to review them with you.

## 2017-09-07 NOTE — ANESTHESIA CARE TRANSFER NOTE
Patient: Mio Delacruz    Procedure(s):  Video Cystoscopy with CUP biopsies of a bladdar tumor  - Wound Class: II-Clean Contaminated    Diagnosis: Bladder tumor   Diagnosis Additional Information: No value filed.    Anesthesia Type:   General, LMA     Note:  Airway :Face Mask and Room Air  Patient transferred to:PACU        Vitals: (Last set prior to Anesthesia Care Transfer)    CRNA VITALS  9/7/2017 1530 - 9/7/2017 1607      9/7/2017             NIBP: 103/66    NIBP Mean: 83    Resp Rate (observed): (!)  2                Electronically Signed By: MARTHA Christensen CRNA  September 7, 2017  4:07 PM

## 2017-09-07 NOTE — DISCHARGE INSTRUCTIONS
CYSTOSCOPY DISCHARGE INSTRUCTIONS  [facility]  [facility]  [phone]    YOU MAY GO BACK TO YOUR NORMAL DIET AND ACTIVITY, UNLESS YOUR DOCTOR TELLS YOU NOT TO.    FOR THE NEXT TWO DAYS, YOU MAY NOTICE:    SOME BLOOD IN YOUR URINE.  SOME BURNING WHEN YOU URINATE (USE THE TOILET).  AN URGE TO URINATE MORE OFTEN.  BLADDER SPASMS.    THESE ARE NORMAL AFTER THE PROCEDURE.  THEY SHOULD GO AWAY AFTER A DAY OR TWO.  TO RELIEVE THESE PROBLEMS:     DRINK 6 TO 8 LARGE GLASSES OF WATER EACH DAY (INCLUDES DRINKS AT MEALS).  THIS WILL HELP CLEAR THE URINE.    TAKE WARM BATHS TO RELIEVE PAIN AND BLADDER SPASMS.  DO NOT ADD ANYTHING TO THE BATH WATER.    YOUR DOCTOR MAY PRESCRIBE PAIN MEDICINE.  YOU MAY ALSO TAKE TYLENOL (ACETAMINOPHEN) FOR PAIN.    CALL YOUR SURGEON IF YOU HAVE:    A FEVER OVER 101 DEGREES.  CHECK YOUR TEMPERATURE UNDER YOUR TONGUE.    CHILLS.    FAILURE TO URINATE (NO URINE COMES OUT WHEN YOU TRY TO USE THE TOILET).  TRY SOAKING IN A BATHTUB FULL OF WARM WATER.  IF STILL NO URINE, CALL YOUR DOCTOR.    A LOT OF BLOOD IN THE URINE, OR BLOOD CLOTS LARGER THAN A NICKEL.      PAIN IN THE BACK OR BELLY AREA (ABDOMEN).    PAIN OR SPASMS THAT ARE NOT RELIEVED BY WARM TUB BATHS AND PAIN MEDICINE.      SEVERE PAIN, BURNING OR OTHER PROBLEMS WHILE PASSING URINE.    PAIN THAT GETS WORSE AFTER TWO DAYS.        GENERAL ANESTHESIA OR SEDATION ADULT DISCHARGE INSTRUCTIONS   SPECIAL PRECAUTIONS FOR 24 HOURS AFTER SURGERY    IT IS NOT UNUSUAL TO FEEL LIGHT-HEADED OR FAINT, UP TO 24 HOURS AFTER SURGERY OR WHILE TAKING PAIN MEDICATION.  IF YOU HAVE THESE SYMPTOMS; SIT FOR A FEW MINUTES BEFORE STANDING AND HAVE SOMEONE ASSIST YOU WHEN YOU GET UP TO WALK OR USE THE BATHROOM.    YOU SHOULD REST AND RELAX FOR THE NEXT 24 HOURS AND YOU MUST MAKE ARRANGEMENTS TO HAVE SOMEONE STAY WITH YOU FOR AT LEAST 24 HOURS AFTER YOUR DISCHARGE.  AVOID HAZARDOUS AND STRENUOUS ACTIVITIES.  DO NOT MAKE IMPORTANT  DECISIONS FOR 24 HOURS.    DO NOT DRIVE ANY VEHICLE OR OPERATE MECHANICAL EQUIPMENT FOR 24 HOURS FOLLOWING THE END OF YOUR SURGERY.  EVEN THOUGH YOU MAY FEEL NORMAL, YOUR REACTIONS MAY BE AFFECTED BY THE MEDICATION YOU HAVE RECEIVED.    DO NOT DRINK ALCOHOLIC BEVERAGES FOR 24 HOURS FOLLOWING YOUR SURGERY.    DRINK CLEAR LIQUIDS (APPLE JUICE, GINGER ALE, 7-UP, BROTH, ETC.).  PROGRESS TO YOUR REGULAR DIET AS YOU FEEL ABLE.    YOU MAY HAVE A DRY MOUTH, A SORE THROAT, MUSCLES ACHES OR TROUBLE SLEEPING.  THESE SHOULD GO AWAY AFTER 24 HOURS.    CALL YOUR DOCTOR FOR ANY OF THE FOLLOWING:  SIGNS OF INFECTION (FEVER, GROWING TENDERNESS AT THE SURGERY SITE, A LARGE AMOUNT OF DRAINAGE OR BLEEDING, SEVERE PAIN, FOUL-SMELLING DRAINAGE, REDNESS OR SWELLING.    IT HAS BEEN OVER 8 TO 10 HOURS SINCE SURGERY AND YOU ARE STILL NOT ABLE TO URINATE (PASS WATER).

## 2017-09-07 NOTE — IP AVS SNAPSHOT
[facility]    [address]    [facility] [place] [address]    Phone:  [phone]    Fax:  [phone]                                       After Visit Summary   9/7/2017    Mio Delacruz    MRN: [id number]           After Visit Summary Signature Page     I have received my discharge instructions, and my questions have been answered. I have discussed any challenges I see with this plan with the nurse or doctor.    ..........................................................................................................................................  Patient/Patient Representative Signature      ..........................................................................................................................................  Patient Representative Print Name and Relationship to Patient    ..................................................               ................................................  Date                                            Time    ..........................................................................................................................................  Reviewed by Signature/Title    ...................................................              ..............................................  Date                                                            Time

## 2017-09-08 LAB — INTERPRETATION ECG - MUSE: NORMAL

## 2017-09-08 NOTE — OP NOTE
DATE OF PROCEDURE:  09/07/2017.      PREOPERATIVE DIAGNOSES:  Transitional cell carcinoma of the bladder, gross hematuria.      POSTOPERATIVE DIAGNOSES:  Transitional cell carcinoma of the bladder, gross hematuria.      PROCEDURES:  Video cystoscopy, examination under anesthetic, cup biopsy of bladder tumor and fulguration.      SURGEON:  Agusto Iverson Jr., MD       ANESTHESIA:  General laryngeal mask.      ESTIMATED BLOOD LOSS:  Less than 5 mL.      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  He began having gross hematuria and has a normal-appearing CT scan and a single papillary tumor on the posterior bladder wall consistent with a superficial transitional cell carcinoma.  He now presents for tumor biopsy and fulguration.  The procedure, the alternatives, risks and followup were carefully discussed.      DESCRIPTION OF THE PROCEDURE:  The patient had taken 500 mg of Cipro this morning with a sip of water.  He was taken to the cystoscopy suite and placed in the supine position.  After adequate general laryngeal mask anesthesia, the patient was placed in the lithotomy position and his genitalia were prepped and draped in a sterile fashion.  A #22 [place] Storz cystoscope with 30 degree lens and video was used to visualize the urethra and bladder using water as an irrigant.  The urethra appeared normal and the prostatic fossa was unobstructed.  A single papillary tumor was seen on the posterior bladder wall, but otherwise very trabeculated bladder.  No other areas of papillary change or raised erythema were seen.  Using the cup biopsy forceps, the tumor was removed with a single bite and sent in formalin to pathology.  The base of the biopsy was then widely cauterized for hemostasis.  A few small clots were irrigated out the bladder from tumor bleeding before the procedure.  Again,  inspection of the entire bladder revealed no other cancers.  The bladder was emptied and the cystoscope removed.  Rectal exam revealed no rectal mass and a benign-feeling prostate gland.  The patient tolerated the procedure well, went to the recovery room in stable condition.      PLAN:  He will be discharged this evening on Cipro 500 mg every 12 hours x2 doses.  He will be called with a tissue report early next week.         RADHA MONTALVO JR, MD             D: 2017 16:14   T: 2017 10:42   MT: GEORGETTE      Name:     PUJA MANZANO   MRN:      [id number]        Account:        [id number]   :      1927           Procedure Date: 2017      Document: [id number]       cc: Dick Montalvo Jr, MD

## 2017-09-11 LAB — COPATH REPORT: NORMAL

## 2017-12-13 ENCOUNTER — OFFICE VISIT (OUTPATIENT)
Dept: UROLOGY | Facility: CLINIC | Age: 82
End: 2017-12-13
Payer: MEDICARE

## 2017-12-13 VITALS
WEIGHT: 182 LBS | HEIGHT: 70 IN | SYSTOLIC BLOOD PRESSURE: 122 MMHG | HEART RATE: 88 BPM | OXYGEN SATURATION: 98 % | DIASTOLIC BLOOD PRESSURE: 60 MMHG | BODY MASS INDEX: 26.05 KG/M2

## 2017-12-13 DIAGNOSIS — C67.9 MALIGNANT NEOPLASM OF URINARY BLADDER, UNSPECIFIED SITE (H): ICD-10-CM

## 2017-12-13 DIAGNOSIS — Z79.2 PROPHYLACTIC ANTIBIOTIC: Primary | ICD-10-CM

## 2017-12-13 DIAGNOSIS — C67.9 MALIGNANT NEOPLASM OF URINARY BLADDER, UNSPECIFIED SITE (H): Primary | ICD-10-CM

## 2017-12-13 LAB
ALBUMIN UR-MCNC: NEGATIVE MG/DL
APPEARANCE UR: CLEAR
BILIRUB UR QL STRIP: NEGATIVE
COLOR UR AUTO: YELLOW
GLUCOSE UR STRIP-MCNC: NEGATIVE MG/DL
HGB UR QL STRIP: NEGATIVE
KETONES UR STRIP-MCNC: NEGATIVE MG/DL
LEUKOCYTE ESTERASE UR QL STRIP: NEGATIVE
NITRATE UR QL: NEGATIVE
PH UR STRIP: 7 PH (ref 5–7)
SOURCE: NORMAL
SP GR UR STRIP: 1.01 (ref 1–1.03)
UROBILINOGEN UR STRIP-ACNC: 0.2 EU/DL (ref 0.2–1)

## 2017-12-13 PROCEDURE — 99212 OFFICE O/P EST SF 10 MIN: CPT | Mod: 25 | Performed by: UROLOGY

## 2017-12-13 PROCEDURE — 81003 URINALYSIS AUTO W/O SCOPE: CPT | Performed by: UROLOGY

## 2017-12-13 PROCEDURE — 52000 CYSTOURETHROSCOPY: CPT | Performed by: UROLOGY

## 2017-12-13 RX ORDER — SULFAMETHOXAZOLE/TRIMETHOPRIM 800-160 MG
1 TABLET ORAL ONCE
Qty: 1 TABLET | Refills: 0 | Status: SHIPPED | OUTPATIENT
Start: 2017-12-13 | End: 2017-12-13

## 2017-12-13 NOTE — MR AVS SNAPSHOT
"              After Visit Summary   12/13/2017    Mio Delacruz    MRN: [id number]           Patient Information     Date Of Birth          1/4/1927        Visit Information        Provider Department      12/13/2017 2:00 PM Agusto Iverson MD; [facility] [place]        Today's Diagnoses     Prophylactic antibiotic    -  1    Malignant neoplasm of urinary bladder, unspecified site (H)          Care Instructions         AFTER YOUR CYSTOSCOPY         You have just completed a cystoscopy, or \"cysto\", which allowed your physician to learn more about your bladder (or to remove a stent placed after surgery). We suggest that you continue to avoid caffeine, fruit juice, and alcohol for the next 24 hours, however, you are encouraged to return to your normal activities.       A few things that are considered normal after your cystoscopy:    * small amount of bleeding (or spotting) that clears within the next 24 hours    * slight burning sensation with urination    * sensation to of needing to avoid more frequently    * the feeling of \"air\" in your urine    * mild discomfort that is relieved with Tylonol        Please contact our office promptly if you:    * develop a fever above 101 degrees    * are unable to urinate    * develop bright red blood that does not stop    * severe pain or swelling        And of course, please contact our office with any concerns or questions [phone]      Sun Babcock MA            Follow-ups after your visit        Follow-up notes from your care team     Return in about 4 years (around 12/13/2021) for BTC.      Your next 10 appointments already scheduled     Apr 18, 2018  1:20 PM CDT   Cystoscopy with Agusto Iverson MD, [facility] [place] ([facility] [place])    [address]  [address]  [facility] [id number] [phone]              Who to contact     If you have questions or need " "follow up information about today's clinic visit or your schedule please contact [facility] [place] directly at [phone].  Normal or non-critical lab and imaging results will be communicated to you by MyChart, letter or phone within 4 business days after the clinic has received the results. If you do not hear from us within 7 days, please contact the clinic through [employer] or phone. If you have a critical or abnormal lab result, we will notify you by phone as soon as possible.  Submit refill requests through Focus or call your pharmacy and they will forward the refill request to us. Please allow 3 business days for your refill to be completed.          Additional Information About Your Visit        [employer] Information     Focus lets you send messages to your doctor, view your test results, renew your prescriptions, schedule appointments and more. To sign up, go to [email or web address] . Click on \"Log in\" on the left side of the screen, which will take you to the Welcome page. Then click on \"Sign up Now\" on the right side of the page.     You will be asked to enter the access code listed below, as well as some personal information. Please follow the directions to create your username and password.     Your access code is: [email or web address]  Expires: 3/13/2018  2:13 PM     Your access code will  in 90 days. If you need help or a new code, please call your [place] clinic or [phone].        Care EveryWhere ID     This is your Care EveryWhere ID. This could be used by other organizations to access your [place] [facility] medical records  [id number]        Your Vitals Were     Pulse Height Pulse Oximetry BMI (Body Mass Index)          88 1.778 m (5' 10\") 98% 26.11 kg/m2         Blood Pressure from Last 3 Encounters:   17 122/60   17 158/76   17 116/60    Weight from Last 3 Encounters:   17 82.6 kg (182 lb)   17 82.6 kg (182 lb 3.2 oz)   17 79.8 " kg (176 lb)              We Performed the Following     CYSTOURETHROSCOPY (99519)     UA without Microscopic          Today's Medication Changes          These changes are accurate as of: 12/13/17  2:18 PM.  If you have any questions, ask your nurse or doctor.               Start taking these medicines.        Dose/Directions    sulfamethoxazole-trimethoprim 800-160 MG per tablet   Commonly known as:  BACTRIM DS/SEPTRA DS   Used for:  Malignant neoplasm of urinary bladder, unspecified site (H)   Started by:  Agusto Iverson MD        Dose:  1 tablet   Take 1 tablet by mouth once for 1 dose   Quantity:  1 tablet   Refills:  0            Where to get your medicines      These medications were sent to [facility], [place] - [address]  [address] [facility] [address], [facility] [address]     Phone:  [phone]     sulfamethoxazole-trimethoprim 800-160 MG per tablet                Primary Care Provider Office Phone # Fax #    Dick Nuñez [phone]       [facility] [address] [facility] [address]  [facility] [address]        Equal Access to Services     [facility]: Hadii jalen cardoza hadasho Soomaali, waaxda luqadaha, qaybta kaalmada adeegyalaxmi, lenore hdz . So [facility] [phone].    ATENCIÓN: Si habla español, tiene a jackson disposición servicios gratuitos de asistencia lingüística. Bienvenidoame al [phone].    We comply with applicable federal civil rights laws and [place] laws. We do not discriminate on the basis of race, color, national origin, age, disability, sex, sexual orientation, or gender identity.            Thank you!     Thank you for choosing [facility] [place]  for your care. Our goal is always to provide you with excellent care. Hearing back from our patients is one way we can continue to improve our services. Please take a few minutes to complete the written survey that you may receive in the mail after your  visit with us. Thank you!             Your Updated Medication List - Protect others around you: Learn how to safely use, store and throw away your medicines at www.disposemymeds.org.          This list is accurate as of: 12/13/17  2:18 PM.  Always use your most recent med list.                   Brand Name Dispense Instructions for use Diagnosis    D 1000 1000 UNITS Caps      Take by mouth daily        NONFORMULARY      Chewable calcium PO daily (pt not sure of dosage).        pravastatin 10 MG tablet    PRAVACHOL     Take 10 mg by mouth At Bedtime        sulfamethoxazole-trimethoprim 800-160 MG per tablet    BACTRIM DS/SEPTRA DS    1 tablet    Take 1 tablet by mouth once for 1 dose    Malignant neoplasm of urinary bladder, unspecified site (H)

## 2017-12-13 NOTE — NURSING NOTE
Chief Complaint   Patient presents with     Bladder Cancer     Patient here today for Cystoscopy     Prior to the start of the procedure and with procedural staff participation, I verbally confirmed the patient s identity using two indicators, relevant allergies, that the procedure was appropriate and matched the consent or emergent situation, and that the correct equipment/implants were available. Immediately prior to starting the procedure I conducted the Time Out with the procedural staff and re-confirmed the patient s name, procedure, and site/side. I have wiped the patient off with the povidone-Iodine solution, draped them,  used Lidocaine hydrochloride jelly, and instilled sterile water into the bladder. (The Joint Commission universal protocol was followed.)  Yes    Sedation (Moderate or Deep): None        UA RESULTS:  Recent Labs   Lab Test  12/13/17   1338   COLOR  Yellow   APPEARANCE  Clear   URINEGLC  Negative   URINEBILI  Negative   URINEKETONE  Negative   SG  1.015   UBLD  Negative   URINEPH  7.0   PROTEIN  Negative   UROBILINOGEN  0.2   NITRITE  Negative   LEUKEST  Negative

## 2017-12-13 NOTE — LETTER
12/13/2017       RE: Mio Delacruz  [address]  [place] [address]     Dear Colleague,    Thank you for referring your patient, Mio Delacruz, to the [facility] [place] at [facility]. Please see a copy of my visit note below.    Mio Delacruz is a 89-year-old gentleman who was treated with radiation years ago for a grade 3 adenocarcinoma the prostate. He has PSAs at [facility] on a regular basis. 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 He now returns for office cystoscopy. He's had no difficulty voiding although he has a chronic elevated postvoid residual, no dysuria or hematuria  Other past medical history: Anemia, hyperlipidemia, nonsmoker  Medications: Vitamin D, Pravachol  Allergies: Penicillin  Exam: Normal appearance, normal vital signs, alert and oriented, normocephalic, normal respirations, neuro grossly intact. Normal phallus and urethral meatus  Flexible cystoscopy-normal urethra, open prostatic fossa was normal mucosa. Normal bladder neck. Large postvoid residual with very trabeculated bladder wall, normal mucosa and no papillary tumors or raised erythema or stones  Assessment: Chronic urinary retention-discussed intermittent catheterization-he refuses. Indicated that he would need to learn this if he becomes more distended and uncomfortable or there is a change in his renal function from his distended bladder  Plan: Septra DS ×1 today. Follow-up in 3-4 months for bladder tumor check. He will be in [place] for the winter  [facility] will be doing PSAs yearly- Will ask Dr. Nuñez to forward those results to me    Again, thank you for allowing me to participate in the care of your patient.      Sincerely,    Agusto Iverson MD

## 2017-12-13 NOTE — PROGRESS NOTES
Mio Delacruz is a 89-year-old gentleman who was treated with radiation years ago for a grade 3 adenocarcinoma the prostate. He has PSAs at [facility] on a regular basis. 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 He now returns for office cystoscopy. He's had no difficulty voiding although he has a chronic elevated postvoid residual, no dysuria or hematuria  Other past medical history: Anemia, hyperlipidemia, nonsmoker  Medications: Vitamin D, Pravachol  Allergies: Penicillin  Exam: Normal appearance, normal vital signs, alert and oriented, normocephalic, normal respirations, neuro grossly intact. Normal phallus and urethral meatus  Flexible cystoscopy-normal urethra, open prostatic fossa was normal mucosa. Normal bladder neck. Large postvoid residual with very trabeculated bladder wall, normal mucosa and no papillary tumors or raised erythema or stones  Assessment: Chronic urinary retention-discussed intermittent catheterization-he refuses. Indicated that he would need to learn this if he becomes more distended and uncomfortable or there is a change in his renal function from his distended bladder  Plan: Septra DS ×1 today. Follow-up in 3-4 months for bladder tumor check. He will be in [place] for the winter  [facility] will be doing PSAs yearly- Will ask Dr. Nuñez to forward those results to me

## 2017-12-13 NOTE — PATIENT INSTRUCTIONS
"     AFTER YOUR CYSTOSCOPY         You have just completed a cystoscopy, or \"cysto\", which allowed your physician to learn more about your bladder (or to remove a stent placed after surgery). We suggest that you continue to avoid caffeine, fruit juice, and alcohol for the next 24 hours, however, you are encouraged to return to your normal activities.       A few things that are considered normal after your cystoscopy:    * small amount of bleeding (or spotting) that clears within the next 24 hours    * slight burning sensation with urination    * sensation to of needing to avoid more frequently    * the feeling of \"air\" in your urine    * mild discomfort that is relieved with Tylonol        Please contact our office promptly if you:    * develop a fever above 101 degrees    * are unable to urinate    * develop bright red blood that does not stop    * severe pain or swelling        And of course, please contact our office with any concerns or questions [phone]      [place], MA    "

## 2018-04-19 DIAGNOSIS — C67.9 BLADDER CANCER (H): Primary | ICD-10-CM

## 2018-04-20 ENCOUNTER — OFFICE VISIT (OUTPATIENT)
Dept: UROLOGY | Facility: CLINIC | Age: 83
End: 2018-04-20
Payer: MEDICARE

## 2018-04-20 VITALS
HEART RATE: 82 BPM | OXYGEN SATURATION: 98 % | HEIGHT: 70 IN | WEIGHT: 182 LBS | SYSTOLIC BLOOD PRESSURE: 138 MMHG | BODY MASS INDEX: 26.05 KG/M2 | DIASTOLIC BLOOD PRESSURE: 56 MMHG

## 2018-04-20 DIAGNOSIS — C67.2 MALIGNANT NEOPLASM OF LATERAL WALL OF URINARY BLADDER (H): ICD-10-CM

## 2018-04-20 PROCEDURE — 99212 OFFICE O/P EST SF 10 MIN: CPT | Mod: 25 | Performed by: UROLOGY

## 2018-04-20 PROCEDURE — 52000 CYSTOURETHROSCOPY: CPT | Performed by: UROLOGY

## 2018-04-20 RX ORDER — CIPROFLOXACIN 500 MG/1
500 TABLET, FILM COATED ORAL ONCE
Qty: 1 TABLET | Refills: 0 | Status: SHIPPED | OUTPATIENT
Start: 2018-04-20 | End: 2018-04-20

## 2018-04-20 ASSESSMENT — PAIN SCALES - GENERAL: PAINLEVEL: NO PAIN (0)

## 2018-04-20 NOTE — PROGRESS NOTES
Mio Delacruz is a 89-year-old male who returns for office cystoscopy. He was diagnosed in September with a grade 1 superficial transitional cell carcinoma of the bladder. He's had no difficulty voiding and no dysuria or hematuria. December's office exam revealed no tumor recurrence.  He was unable to leave a urinalysis for exam today  Other past medical history: Prostate cancer treated with radiation-PSAs apparently stable at [facility], history of anemia, hyperlipidemia, cholecystectomy, nonsmoker  Medications: Vitamin D, Pravachol  Allergies: Penicillin  Exam: Normal appearance, alert and oriented, normocephalic, normal respirations, normal external genitalia  Flexible cystoscopy-normal urethra, radiated prostatic fossa with no tumors are raised erythema. Normal bladder mucosa, very trabeculated bladder wall, no papillary changes or raised erythema.  Assessment: No recurrence of transitional cell carcinoma of the bladder  History of adenocarcinoma of the prostate with stable PSA at [facility]-he thinks PSA has been 0.5  Plan: Cipro ×1 today. Follow-up in early September for office cystoscopy-he would prefer not to come every 3 months  PSA yearly at [facility]-apparently due in August

## 2018-04-20 NOTE — MR AVS SNAPSHOT
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 We suggest that you continue to avoid caffeine, fruit juice, and alcohol for the next 24 hours, however, you are encouraged to return to your normal activities.       A few things that are considered normal after your cystoscopy:    * small amount of bleeding (or spotting) that clears within the next 24 hours    * slight burning sensation with urination    * sensation to of needing to avoid more frequently    * the feeling of \"air\" in your urine    * mild discomfort that is relieved with Tylonol        Please contact our office promptly if you:    * develop a fever above 101 degrees    * are unable to urinate    * develop bright red blood that does not stop    * severe pain or swelling        And of course, please contact our office with any concerns or questions [phone]          Follow-ups after your visit        Your next 10 appointments already scheduled     Sep 26, 2018 11:00 AM CDT   Cystoscopy with Agusto Iverson MD, [facility] [place] ([facility])    [address]  [address]  [facility] [id number] [phone]              Who to contact     If you have questions or need follow up information about today's clinic visit or your schedule please contact [facility] [place] directly at " "[phone].  Normal or non-critical lab and imaging results will be communicated to you by MyChart, letter or phone within 4 business days after the clinic has received the results. If you do not hear from us within 7 days, please contact the clinic through [employer] or phone. If you have a critical or abnormal lab result, we will notify you by phone as soon as possible.  Submit refill requests through Segment or call your pharmacy and they will forward the refill request to us. Please allow 3 business days for your refill to be completed.          Additional Information About Your Visit        [employer] Information     Segment lets you send messages to your doctor, view your test results, renew your prescriptions, schedule appointments and more. To sign up, go to [email or web address] . Click on \"Log in\" on the left side of the screen, which will take you to the Welcome page. Then click on \"Sign up Now\" on the right side of the page.     You will be asked to enter the access code listed below, as well as some personal information. Please follow the directions to create your username and password.     Your access code is: [email or web address]  Expires: 2018 11:16 AM     Your access code will  in 90 days. If you need help or a new code, please call your [facility] clinic or [phone].        Care EveryWhere ID     This is your Care EveryWhere ID. This could be used by other organizations to access your [facility] medical records  [id number]        Your Vitals Were     Pulse Height Pulse Oximetry BMI (Body Mass Index)          82 1.778 m (5' 10\") 98% 26.11 kg/m2         Blood Pressure from Last 3 Encounters:   18 138/56   17 122/60   17 158/76    Weight from Last 3 Encounters:   18 82.6 kg (182 lb)   17 82.6 kg (182 lb)   17 82.6 kg (182 lb 3.2 oz)              We Performed the Following     CYSTOURETHROSCOPY (03734)          Today's Medication Changes          These changes are " accurate as of 4/20/18 11:23 AM.  If you have any questions, ask your nurse or doctor.               Start taking these medicines.        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  [address], [facility] [address]     Phone:  [phone]     ciprofloxacin 500 MG tablet                Primary Care Provider Office Phone # Fax #    Dick Nuñez [phone]       INTERNAL [facility]ICINE [facility] [address]  [facility] [address]        Equal Access to Services     ISMAEL OLMSTEAD : Hadnadia mora Solara, waaxda lualec, qaybta pilaralmalaxmi packer, lenore hdz . So [facility] [phone].    ATENCIÓN: Si habla español, tiene a jackson disposición servicios gratuitos de asistencia lingüística. Audra al [phone].    We comply with applicable federal civil rights laws and [place] laws. We do not discriminate on the basis of race, color, national origin, age, disability, sex, sexual orientation, or gender identity.            Thank you!     Thank you for choosing [facility] [place]  for your care. Our goal is always to provide you with excellent care. Hearing back from our patients is one way we can continue to improve our services. Please take a few minutes to complete the written survey that you may receive in the mail after your visit with us. Thank you!             Your Updated Medication List - Protect others around you: Learn how to safely use, store and throw away your medicines at www.disposemymeds.org.          This list is accurate as of 4/20/18 11:23  AM.  Always use your most recent med list.                   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        pravastatin 10 MG tablet    PRAVACHOL     Take 10 mg by mouth At Bedtime

## 2018-04-20 NOTE — PATIENT INSTRUCTIONS
"     AFTER YOUR CYSTOSCOPY         You have just completed a cystoscopy, or \"cysto\", which allowed your physician to learn more about your bladder (or to remove a stent placed after surgery). We suggest that you continue to avoid caffeine, fruit juice, and alcohol for the next 24 hours, however, you are encouraged to return to your normal activities.       A few things that are considered normal after your cystoscopy:    * small amount of bleeding (or spotting) that clears within the next 24 hours    * slight burning sensation with urination    * sensation to of needing to avoid more frequently    * the feeling of \"air\" in your urine    * mild discomfort that is relieved with Tylonol        Please contact our office promptly if you:    * develop a fever above 101 degrees    * are unable to urinate    * develop bright red blood that does not stop    * severe pain or swelling        And of course, please contact our office with any concerns or questions [phone]  "

## 2018-04-20 NOTE — LETTER
4/20/2018       RE: Mio Delacruz  [address]  [place] [address]     Dear Colleague,    Thank you for referring your patient, Mio Delacruz, to the [facility] [place] [facility]. Please see a copy of my visit note below.    Mio Delacruz is a 89-year-old male who returns for office cystoscopy. He was diagnosed in September with a grade 1 superficial transitional cell carcinoma of the bladder. He's had no difficulty voiding and no dysuria or hematuria. December's office exam revealed no tumor recurrence.  He was unable to leave a urinalysis for exam today  Other past medical history: Prostate cancer treated with radiation-PSAs apparently stable at [facility], history of anemia, hyperlipidemia, cholecystectomy, nonsmoker  Medications: Vitamin D, Pravachol  Allergies: Penicillin  Exam: Normal appearance, alert and oriented, normocephalic, normal respirations, normal external genitalia  Flexible cystoscopy-normal urethra, radiated prostatic fossa with no tumors are raised erythema. Normal bladder mucosa, very trabeculated bladder wall, no papillary changes or raised erythema.  Assessment: No recurrence of transitional cell carcinoma of the bladder  History of adenocarcinoma of the prostate with stable PSA at [facility]-he thinks PSA has been 0.5  Plan: Cipro ×1 today. Follow-up in early September for office cystoscopy-he would prefer not to come every 3 months  PSA yearly at [facility]-apparently due in August    Again, thank you for allowing me to participate in the care of your patient.      Sincerely,    Agusto Iverson MD

## 2018-04-20 NOTE — NURSING NOTE
Chief Complaint   Patient presents with     Cystoscopy     Pt here today for cysto due to bladder cancer     Prior to the start of the procedure and with procedural staff participation, I verbally confirmed the patient s identity using two indicators, relevant allergies, that the procedure was appropriate and matched the consent or emergent situation, and that the correct equipment/implants were available. Immediately prior to starting the procedure I conducted the Time Out with the procedural staff and re-confirmed the patient s name, procedure, and site/side. I have wiped the patient off with the povidone-Iodine solution, draped them,  used Lidocaine hydrochloride jelly, and instilled sterile water into the bladder. (The Joint Commission universal protocol was followed.)  Yes    Sedation (Moderate or Deep): None    Tabby Tong CMA

## 2018-09-26 ENCOUNTER — OFFICE VISIT (OUTPATIENT)
Dept: UROLOGY | Facility: CLINIC | Age: 83
End: 2018-09-26
Payer: MEDICARE

## 2018-09-26 VITALS
BODY MASS INDEX: 26.05 KG/M2 | OXYGEN SATURATION: 97 % | WEIGHT: 182 LBS | HEART RATE: 88 BPM | DIASTOLIC BLOOD PRESSURE: 60 MMHG | HEIGHT: 70 IN | SYSTOLIC BLOOD PRESSURE: 122 MMHG

## 2018-09-26 DIAGNOSIS — Z79.2 PROPHYLACTIC ANTIBIOTIC: ICD-10-CM

## 2018-09-26 DIAGNOSIS — C67.9 MALIGNANT NEOPLASM OF URINARY BLADDER, UNSPECIFIED SITE (H): Primary | ICD-10-CM

## 2018-09-26 PROCEDURE — 52000 CYSTOURETHROSCOPY: CPT | Performed by: UROLOGY

## 2018-09-26 PROCEDURE — 99213 OFFICE O/P EST LOW 20 MIN: CPT | Mod: 25 | Performed by: UROLOGY

## 2018-09-26 PROCEDURE — 81003 URINALYSIS AUTO W/O SCOPE: CPT | Performed by: UROLOGY

## 2018-09-26 RX ORDER — CIPROFLOXACIN 500 MG/1
500 TABLET, FILM COATED ORAL ONCE
Qty: 1 TABLET | Refills: 0 | Status: SHIPPED | OUTPATIENT
Start: 2018-09-26 | End: 2018-09-26

## 2018-09-26 RX ORDER — ACETAMINOPHEN 500 MG
500 TABLET ORAL
COMMUNITY

## 2018-09-26 ASSESSMENT — PAIN SCALES - GENERAL: PAINLEVEL: NO PAIN (0)

## 2018-09-26 NOTE — LETTER
9/26/2018       RE: Mio Delacruz  [address]  [place] [address]     Dear Colleague,    Thank you for referring your patient, Mio Delacruz, to the [facility] [place] [facility]. Please see a copy of my visit note below.    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 He also has history of prostate cancer that was treated with radiation therapy. Last PSA at [facility] was in 2016 and was 0.48. He claims his primary care physician in the [facility] did a PSA in the spring that was 0.30.  He's had no dysuria or hematuria.  Other past medical history: Medications and allergies reviewed  Exam: Alert and oriented, normocephalic, normal respirations. Normal external genitalia.  Urinalysis unremarkable-not enough volume for fish test or urine cytology  Flexible cystoscopy-normal urethra, open prostatic fossa with radiation changes, large bladder residual with trabeculated bladder, no raised erythema, stones or papillary tumors.  Assessment: History of prostate cancer-PSA stable, history of bladder cancer-no recurrence. Incomplete voiding-will follow and check postvoid residual next visit.  Plan: Cipro 500 mg ×1 today. See me for office cystoscopy and urine cytology in 4 months  PSA yearly with Dr. Nuñez    Again, thank you for allowing me to participate in the care of your patient.      Sincerely,    Agusto Iverson MD

## 2018-09-26 NOTE — MR AVS SNAPSHOT
"              After Visit Summary   9/26/2018    Mio Delacruz    MRN: [id number]           Patient Information     Date Of Birth          1/4/1927        Visit Information        Provider Department      9/26/2018 10:00 AM Agusto Iverson MD; [facility] [place]        Today's Diagnoses     Malignant neoplasm of urinary bladder, unspecified site (H)    -  1    Prophylactic antibiotic          Care Instructions         AFTER YOUR CYSTOSCOPY         You have just completed a cystoscopy, or \"cysto\", which allowed your physician to learn more about your bladder (or to remove a stent placed after surgery). We suggest that you continue to avoid caffeine, fruit juice, and alcohol for the next 24 hours, however, you are encouraged to return to your normal activities.       A few things that are considered normal after your cystoscopy:    * small amount of bleeding (or spotting) that clears within the next 24 hours    * slight burning sensation with urination    * sensation to of needing to avoid more frequently    * the feeling of \"air\" in your urine    * mild discomfort that is relieved with Tylonol        Please contact our office promptly if you:    * develop a fever above 101 degrees    * are unable to urinate    * develop bright red blood that does not stop    * severe pain or swelling        And of course, please contact our office with any concerns or questions [phone]                Follow-ups after your visit        Follow-up notes from your care team     Return in about 4 months (around 1/26/2019) for BTC.      Your next 10 appointments already scheduled     Apr 24, 2019 10:00 AM CDT   Cystoscopy with Agusto Iverson MD, [facility] [place] ([facility] [place])    [address]  [address]  [facility] [id number] [phone]              Who to contact     If you have questions or need follow up information " "about today's clinic visit or your schedule please contact [facility] [place] directly at [phone].  Normal or non-critical lab and imaging results will be communicated to you by Swink.tvhart, letter or phone within 4 business days after the clinic has received the results. If you do not hear from us within 7 days, please contact the clinic through Swink.tvhart or phone. If you have a critical or abnormal lab result, we will notify you by phone as soon as possible.  Submit refill requests through Togethera or call your pharmacy and they will forward the refill request to us. Please allow 3 business days for your refill to be completed.          Additional Information About Your Visit        Swink.[email or web address] Food Service Information     Togethera lets you send messages to your doctor, view your test results, renew your prescriptions, schedule appointments and more. To sign up, go to [email or web address] . Click on \"Log in\" on the left side of the screen, which will take you to the Welcome page. Then click on \"Sign up Now\" on the right side of the page.     You will be asked to enter the access code listed below, as well as some personal information. Please follow the directions to create your username and password.     Your access code is: [email or web address]  Expires: 2018 10:22 AM     Your access code will  in 90 days. If you need help or a new code, please call your [place] clinic or [phone].        Care EveryWhere ID     This is your Care EveryWhere ID. This could be used by other organizations to access your [place] medical records  [id number]        Your Vitals Were     Pulse Height Pulse Oximetry BMI (Body Mass Index)          88 1.778 m (5' 10\") 97% 26.11 kg/m2         Blood Pressure from Last 3 Encounters:   18 122/60   18 138/56   17 122/60    Weight from Last 3 Encounters:   18 82.6 kg (182 lb)   18 82.6 kg (182 lb)   17 82.6 kg (182 lb)              We " Performed the Following     CYSTOURETHROSCOPY (15412)     UA without Microscopic          Today's Medication Changes          These changes are accurate as of 9/26/18 10:37 AM.  If you have any questions, ask your nurse or doctor.               Start taking these medicines.        Dose/Directions    ciprofloxacin 500 MG tablet   Commonly known as:  CIPRO   Used for:  Prophylactic antibiotic   Started by:  Agusto Iverson MD        Dose:  500 mg   Take 1 tablet (500 mg) by mouth once for 1 dose   Quantity:  1 tablet   Refills:  0            Where to get your medicines      These medications were sent to [facility] - [place], [place] - [address]  [address] [facility] [address], [facility] [address]     Phone:  [phone]     ciprofloxacin 500 MG tablet                Primary Care Provider Office Phone # Fax #    Dick Nuñez [phone]       [facility] [address]  [facility] [address]        Equal Access to Services     GUERITA OLMSTEAD : Hadii jalen priceo Solara, waaxda lualec, qaybta kaalmada birdie, lenore mora. So [facility] [phone].    ATENCIÓN: Si habla español, tiene a jackson disposición servicios gratuitos de asistencia lingüística. Audra al [phone].    We comply with applicable federal civil rights laws and [place] laws. We do not discriminate on the basis of race, color, national origin, age, disability, sex, sexual orientation, or gender identity.            Thank you!     Thank you for choosing [facility] [place]  for your care. Our goal is always to provide you with excellent care. Hearing back from our patients is one way we can continue to improve our services. Please take a few minutes to complete the written survey that you may receive in the mail after your visit with us. Thank you!             Your Updated Medication List - Protect others around you: Learn how to safely use,  store and throw away your medicines at www.disposemymeds.org.          This list is accurate as of 9/26/18 10:37 AM.  Always use your most recent med list.                   Brand Name Dispense Instructions for use Diagnosis    acetaminophen 500 MG tablet    TYLENOL     Take 500 mg by mouth        apixaban ANTICOAGULANT 2.5 MG tablet    ELIQUIS     Take 2.5 mg by mouth        calcium carbonate 600 mg-vitamin D 400 units 600-400 MG-UNIT per tablet    CALTRATE     Take 1 tablet by mouth        ciprofloxacin 500 MG tablet    CIPRO    1 tablet    Take 1 tablet (500 mg) by mouth once for 1 dose    Prophylactic antibiotic       D 1000 1000 units Caps      Take by mouth daily        NONFORMULARY      Chewable calcium PO daily (pt not sure of dosage).        pravastatin 10 MG tablet    PRAVACHOL     Take 10 mg by mouth At Bedtime

## 2018-09-26 NOTE — PATIENT INSTRUCTIONS
"     AFTER YOUR CYSTOSCOPY         You have just completed a cystoscopy, or \"cysto\", which allowed your physician to learn more about your bladder (or to remove a stent placed after surgery). We suggest that you continue to avoid caffeine, fruit juice, and alcohol for the next 24 hours, however, you are encouraged to return to your normal activities.       A few things that are considered normal after your cystoscopy:    * small amount of bleeding (or spotting) that clears within the next 24 hours    * slight burning sensation with urination    * sensation to of needing to avoid more frequently    * the feeling of \"air\" in your urine    * mild discomfort that is relieved with Tylonol        Please contact our office promptly if you:    * develop a fever above 101 degrees    * are unable to urinate    * develop bright red blood that does not stop    * severe pain or swelling        And of course, please contact our office with any concerns or questions [phone]        "

## 2018-09-26 NOTE — NURSING NOTE
Chief Complaint   Patient presents with     Hx of Bladder Cancer     Patient here today for Cystoscopy       UA RESULTS:  Recent Labs   Lab Test  09/26/18   0954   COLOR  Yellow   APPEARANCE  Clear   URINEGLC  Negative   URINEBILI  Negative   URINEKETONE  Negative   SG  1.015   UBLD  Negative   URINEPH  7.0   PROTEIN  Negative   UROBILINOGEN  0.2   NITRITE  Negative   LEUKEST  Negative     Prior to the start of the procedure and with procedural staff participation, I verbally confirmed the patient s identity using two indicators, relevant allergies, that the procedure was appropriate and matched the consent or emergent situation, and that the correct equipment/implants were available. Immediately prior to starting the procedure I conducted the Time Out with the procedural staff and re-confirmed the patient s name, procedure, and site/side. I have wiped the patient off with the povidone-Iodine solution, draped them,  used Lidocaine hydrochloride jelly, and instilled sterile water into the bladder. (The Joint Commission universal protocol was followed.)  Yes    Sedation (Moderate or Deep): None

## 2018-09-26 NOTE — PROGRESS NOTES
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 He also has history of prostate cancer that was treated with radiation therapy. Last PSA at [facility] was in 2016 and was 0.48. He claims his primary care physician in the [facility] did a PSA in the spring that was 0.30.  He's had no dysuria or hematuria.  Other past medical history: Medications and allergies reviewed  Exam: Alert and oriented, normocephalic, normal respirations. Normal external genitalia.  Urinalysis unremarkable-not enough volume for fish test or urine cytology  Flexible cystoscopy-normal urethra, open prostatic fossa with radiation changes, large bladder residual with trabeculated bladder, no raised erythema, stones or papillary tumors.  Assessment: History of prostate cancer-PSA stable, history of bladder cancer-no recurrence. Incomplete voiding-will follow and check postvoid residual next visit.  Plan: Cipro 500 mg ×1 today. See me for office cystoscopy and urine cytology in 4 months  PSA yearly with Dr. Nuñez

## 2019-08-20 DIAGNOSIS — R97.20 ELEVATED PROSTATE SPECIFIC ANTIGEN (PSA): Primary | ICD-10-CM

## 2019-08-21 ENCOUNTER — OFFICE VISIT (OUTPATIENT)
Dept: UROLOGY | Facility: CLINIC | Age: 84
End: 2019-08-21
Payer: MEDICARE

## 2019-08-21 VITALS
HEIGHT: 70 IN | HEART RATE: 81 BPM | BODY MASS INDEX: 24.62 KG/M2 | OXYGEN SATURATION: 100 % | SYSTOLIC BLOOD PRESSURE: 120 MMHG | DIASTOLIC BLOOD PRESSURE: 70 MMHG | WEIGHT: 172 LBS

## 2019-08-21 DIAGNOSIS — Z79.2 PROPHYLACTIC ANTIBIOTIC: ICD-10-CM

## 2019-08-21 DIAGNOSIS — C67.9 MALIGNANT NEOPLASM OF URINARY BLADDER, UNSPECIFIED SITE (H): Primary | ICD-10-CM

## 2019-08-21 DIAGNOSIS — R97.20 ELEVATED PROSTATE SPECIFIC ANTIGEN (PSA): ICD-10-CM

## 2019-08-21 DIAGNOSIS — C61 PROSTATE CANCER (H): ICD-10-CM

## 2019-08-21 LAB
ALBUMIN UR-MCNC: NEGATIVE MG/DL
APPEARANCE UR: CLEAR
BILIRUB UR QL STRIP: NEGATIVE
COLOR UR AUTO: YELLOW
GLUCOSE UR STRIP-MCNC: NEGATIVE MG/DL
HGB UR QL STRIP: NEGATIVE
KETONES UR STRIP-MCNC: NEGATIVE MG/DL
LEUKOCYTE ESTERASE UR QL STRIP: NEGATIVE
NITRATE UR QL: NEGATIVE
PH UR STRIP: 6.5 PH (ref 5–7)
PSA SERPL-MCNC: 0.25 NG/ML (ref 0–4)
SOURCE: NORMAL
SP GR UR STRIP: 1.01 (ref 1–1.03)
UROBILINOGEN UR STRIP-ACNC: 0.2 EU/DL (ref 0.2–1)

## 2019-08-21 PROCEDURE — 81003 URINALYSIS AUTO W/O SCOPE: CPT | Performed by: UROLOGY

## 2019-08-21 PROCEDURE — 84153 ASSAY OF PSA TOTAL: CPT | Performed by: UROLOGY

## 2019-08-21 PROCEDURE — 88112 CYTOPATH CELL ENHANCE TECH: CPT | Performed by: UROLOGY

## 2019-08-21 PROCEDURE — 88112 CYTOPATH CELL ENHANCE TECH: CPT | Mod: 26 | Performed by: UROLOGY

## 2019-08-21 PROCEDURE — 36415 COLL VENOUS BLD VENIPUNCTURE: CPT | Performed by: UROLOGY

## 2019-08-21 PROCEDURE — 52000 CYSTOURETHROSCOPY: CPT | Performed by: UROLOGY

## 2019-08-21 PROCEDURE — 99212 OFFICE O/P EST SF 10 MIN: CPT | Mod: 25 | Performed by: UROLOGY

## 2019-08-21 RX ORDER — CIPROFLOXACIN 500 MG/1
500 TABLET, FILM COATED ORAL ONCE
Qty: 1 TABLET | Refills: 0 | Status: SHIPPED | OUTPATIENT
Start: 2019-08-21 | End: 2019-08-21

## 2019-08-21 RX ORDER — LIDOCAINE HYDROCHLORIDE 20 MG/ML
JELLY TOPICAL ONCE
Status: COMPLETED | OUTPATIENT
Start: 2019-08-21 | End: 2019-08-21

## 2019-08-21 RX ADMIN — LIDOCAINE HYDROCHLORIDE: 20 JELLY TOPICAL at 14:43

## 2019-08-21 ASSESSMENT — MIFFLIN-ST. JEOR: SCORE: 1436.44

## 2019-08-21 ASSESSMENT — PAIN SCALES - GENERAL: PAINLEVEL: NO PAIN (0)

## 2019-08-21 NOTE — NURSING NOTE
"Chief Complaint   Patient presents with     Bladder Cancer     Patient here today for Cystoscopy       Blood pressure 120/70, pulse 81, height 1.778 m (5' 10\"), weight 78 kg (172 lb), SpO2 100 %. Body mass index is 24.68 kg/m .    There is no problem list on file for this patient.      Allergies   Allergen Reactions     Azithromycin Itching     Penicillins Rash       Current Outpatient Medications   Medication Sig Dispense Refill     acetaminophen (TYLENOL) 500 MG tablet Take 500 mg by mouth       apixaban ANTICOAGULANT (ELIQUIS) 2.5 MG tablet Take 2.5 mg by mouth       calcium carbonate 600 mg-vitamin D 400 units (CALTRATE) 600-400 MG-UNIT per tablet Take 1 tablet by mouth       Cholecalciferol (D 1000) 1000 UNITS CAPS Take by mouth daily        ciprofloxacin (CIPRO) 500 MG tablet Take 1 tablet (500 mg) by mouth once for 1 dose 1 tablet 0     NONFORMULARY Chewable calcium PO daily (pt not sure of dosage).       pravastatin (PRAVACHOL) 10 MG tablet Take 10 mg by mouth At Bedtime   2       Social History     Tobacco Use     Smoking status: Never Smoker     Smokeless tobacco: Never Used   Substance Use Topics     Alcohol use: Yes     Comment: 1 glass wine daily     Drug use: No   UA RESULTS:  Recent Labs   Lab Test 08/21/19  1035   COLOR Yellow   APPEARANCE Clear   URINEGLC Negative   URINEBILI Negative   URINEKETONE Negative   SG 1.010   UBLD Negative   URINEPH 6.5   PROTEIN Negative   UROBILINOGEN 0.2   NITRITE Negative   LEUKEST Negative     Prior to the start of the procedure and with procedural staff participation, I verbally confirmed the patient s identity using two indicators, relevant allergies, that the procedure was appropriate and matched the consent or emergent situation, and that the correct equipment/implants were available. Immediately prior to starting the procedure I conducted the Time Out with the procedural staff and re-confirmed the patient s name, procedure, and site/side. I have wiped the " patient off with the povidone-Iodine solution, draped them,  used Lidocaine hydrochloride jelly, and instilled sterile water into the bladder. (The Joint Commission universal protocol was followed.)  Yes    Sedation (Moderate or Deep): None      5mL 2% lidocaine hydrochloride Urojet instilled into urethra.    NDC# [address]-37  Lot #: [id number]  Expiration Date:  05/21        KELI Bui  8/21/2019  2:38 PM

## 2019-08-21 NOTE — PATIENT INSTRUCTIONS

## 2019-08-21 NOTE — LETTER
8/21/2019       RE: Mio Delacruz  [address] [address]     Dear Colleague,    Thank you for referring your patient, Mio Delacruz, to the [facility] [place] at [facility]. Please see a copy of my visit note below.    Mio Delacruz is a 89-year-old gentleman with a history of superficial bladder cancer and prostate cancer.  He is poorly compliant with follow-up and has not been seen in  11 months.  His PSA today is 0.25, having undergone radiation therapy several years ago.  He is overdue for his office cystoscopy.  He complains of urinary incontinence which he can avoid if he voids every 2 hours.  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  Other past medical history: Anemia, hyperlipidemia, non-smoker.  Surgeries include seed implants, cholecystectomy, cystoscopy with bladder biopsy, prostatic biopsies  Medications: Reviewed  Allergies: Azithromycin, penicillin  Review of systems: No dysuria or hematuria  Urinalysis sent for cytology  Exam: Alert and oriented, normal external genitalia  Flexible cystoscopy-normal urethra, radiated prosthetic fossa that is open.  Large bladder residual, normal bladder mucosa, no papillary tumors or raised erythema, normal ureteral orifices  Creatinine 1.11 in October last year  Assessment: Prostate cancer-stable PSA.  Bladder cancer-no evidence of recurrence.  Urinary retention with incontinence due to overflow  Plan: Antibiotic x1 today.  He will consider learning again how to do intermittent catheterization to do every morning and every evening.  Follow-up with me in 6 months for office cystoscopy and PSA    Again, thank you for allowing me to participate in the care of your patient.      Sincerely,    Agusto Iverson MD

## 2019-08-22 LAB — COPATH REPORT: NORMAL

## 2019-08-30 ENCOUNTER — ALLIED HEALTH/NURSE VISIT (OUTPATIENT)
Dept: UROLOGY | Facility: CLINIC | Age: 84
End: 2019-08-30
Payer: MEDICARE

## 2019-08-30 DIAGNOSIS — C67.9 MALIGNANT NEOPLASM OF URINARY BLADDER, UNSPECIFIED SITE (H): Primary | ICD-10-CM

## 2019-08-30 LAB
ALBUMIN UR-MCNC: NEGATIVE MG/DL
APPEARANCE UR: CLEAR
BILIRUB UR QL STRIP: NEGATIVE
COLOR UR AUTO: YELLOW
GLUCOSE UR STRIP-MCNC: NEGATIVE MG/DL
HGB UR QL STRIP: NEGATIVE
KETONES UR STRIP-MCNC: NEGATIVE MG/DL
LEUKOCYTE ESTERASE UR QL STRIP: NEGATIVE
NITRATE UR QL: NEGATIVE
PH UR STRIP: 6.5 PH (ref 5–7)
SOURCE: NORMAL
SP GR UR STRIP: 1.01 (ref 1–1.03)
UROBILINOGEN UR STRIP-ACNC: 1 EU/DL (ref 0.2–1)

## 2019-08-30 PROCEDURE — 99211 OFF/OP EST MAY X REQ PHY/QHP: CPT

## 2019-08-30 PROCEDURE — 81003 URINALYSIS AUTO W/O SCOPE: CPT | Performed by: UROLOGY

## 2019-08-30 NOTE — NURSING NOTE
Mio Delacruz comes into clinic today at the request of dr. Garcia Ordering Provider for SIC      This service provided today was under the supervising provider of the emory badillo, who was available if needed.    Hermelinda Riggs, [facility]

## 2019-08-30 NOTE — NURSING NOTE
Pt taught SIC.  Pt had a tough time bending over to see his penis.  Pt states he doesn't think he will be able to do the SIC at home.  I encouraged him that he will be able to do it and to take his time.   order faxed.  WALT Riggs CMA

## 2019-09-10 ENCOUNTER — TELEPHONE (OUTPATIENT)
Dept: UROLOGY | Facility: CLINIC | Age: 84
End: 2019-09-10

## 2019-09-10 NOTE — TELEPHONE ENCOUNTER
Patient is on Cipro. For UTI after going to the Urgency room 09-.  Patient said he was to cath. 2 x daily.  But he doesn't do it that often.  He is wondering what his options are, since he developed this UTI after he started straight cathing.     He is going to set up an appointment to see Dr. Iverson.  He would like to have his urine checked again to see if the infection has cleared.  He is still taking the antibiotic.    I told him to finish all the antibiotic even if he is feeling better.  Lilian VELAZQUEZ  [facility] Urology  September 10, 2019 3:15 PM          [facility] Call Center    Phone Message    May a detailed message be left on voicemail: yes    Reason for Call: Other: Pt requests a call back from Staff to discuss a request to come in for testing by the Pt.  Pt believes he may have a UTI     Action Taken: Message routed to:  [facility] urology

## 2019-09-25 ENCOUNTER — OFFICE VISIT (OUTPATIENT)
Dept: UROLOGY | Facility: CLINIC | Age: 84
End: 2019-09-25
Payer: MEDICARE

## 2019-09-25 VITALS
WEIGHT: 171 LBS | OXYGEN SATURATION: 99 % | BODY MASS INDEX: 24.48 KG/M2 | DIASTOLIC BLOOD PRESSURE: 68 MMHG | SYSTOLIC BLOOD PRESSURE: 118 MMHG | HEART RATE: 97 BPM | HEIGHT: 70 IN

## 2019-09-25 DIAGNOSIS — R33.9 URINARY RETENTION: ICD-10-CM

## 2019-09-25 DIAGNOSIS — R33.9 URINARY RETENTION: Primary | ICD-10-CM

## 2019-09-25 PROCEDURE — 99212 OFFICE O/P EST SF 10 MIN: CPT | Performed by: UROLOGY

## 2019-09-25 PROCEDURE — 81003 URINALYSIS AUTO W/O SCOPE: CPT | Performed by: UROLOGY

## 2019-09-25 ASSESSMENT — PAIN SCALES - GENERAL: PAINLEVEL: NO PAIN (0)

## 2019-09-25 ASSESSMENT — MIFFLIN-ST. JEOR: SCORE: 1431.9

## 2019-09-25 NOTE — LETTER
9/25/2019       RE: Mio Delacruz  [address] [address]     Dear Colleague,    Thank you for referring your patient, Mio Delacruz, to the [facility] [place] at [facility]. Please see a copy of my visit note below.    Mio is a 89-year-old male who was seen last month and had a normal cystoscopy and a stable PSA.  I requested the come back in 6 months.  He will not be back from his winter home until April.  He is had 2 recent emergency room visits and has been put on Cipro and the second time Keflex for a urinary tract infection.  Both urine cultures came back negative.  Unfortunately, the doctors did not call him to tell him to stop his oral antibiotic.  The patient is having no trouble passing his catheter  He does not want to catheterize twice a day but is willing to catheterize once at bedtime.  He has noted a better stream during the daytime and this is perhaps due to better tone in the bladder wall from catheterizing at night.  Exam: Alert and oriented.  Many questions answered.  Urinalysis: Normal  Assessment: Partial urinary retention, bladder cancer, prostate cancer  Plan: See me in April for PSA and office cystoscopy.  Urinalysis and urine culture if he has frequency or other symptoms of a urinary tract infection    Again, thank you for allowing me to participate in the care of your patient.      Sincerely,    Agusto Iverson MD

## 2019-09-25 NOTE — PROGRESS NOTES
Mio is a 89-year-old male who was seen last month and had a normal cystoscopy and a stable PSA.  I requested the come back in 6 months.  He will not be back from his winter home until April.  He is had 2 recent emergency room visits and has been put on Cipro and the second time Keflex for a urinary tract infection.  Both urine cultures came back negative.  Unfortunately, the doctors did not call him to tell him to stop his oral antibiotic.  The patient is having no trouble passing his catheter  He does not want to catheterize twice a day but is willing to catheterize once at bedtime.  He has noted a better stream during the daytime and this is perhaps due to better tone in the bladder wall from catheterizing at night.  Exam: Alert and oriented.  Many questions answered.  Urinalysis: Normal  Assessment: Partial urinary retention, bladder cancer, prostate cancer  Plan: See me in April for PSA and office cystoscopy.  Urinalysis and urine culture if he has frequency or other symptoms of a urinary tract infection

## 2019-10-16 ENCOUNTER — TELEPHONE (OUTPATIENT)
Dept: UROLOGY | Facility: CLINIC | Age: 84
End: 2019-10-16

## 2019-10-16 DIAGNOSIS — N39.0 URINARY TRACT INFECTION: Primary | ICD-10-CM

## 2019-10-16 NOTE — TELEPHONE ENCOUNTER
Had recent UTI treated at [place] . Wants lab appointment to  Make sure it has cleared .Columba Corbett LPN

## 2019-10-16 NOTE — TELEPHONE ENCOUNTER
[facility] Call Center    Phone Message    May a detailed message be left on voicemail: yes    Reason for Call: Other: Patient would like an order put in for a urine sample, pt think he might have a bladder or urine infection. Please call to let the pt know if that can be done.     Action Taken: Other: p Urology

## 2019-10-17 DIAGNOSIS — N39.0 URINARY TRACT INFECTION: ICD-10-CM

## 2019-10-17 LAB
ALBUMIN UR-MCNC: NEGATIVE MG/DL
APPEARANCE UR: CLEAR
BILIRUB UR QL STRIP: NEGATIVE
COLOR UR AUTO: YELLOW
GLUCOSE UR STRIP-MCNC: NEGATIVE MG/DL
HGB UR QL STRIP: NEGATIVE
KETONES UR STRIP-MCNC: NEGATIVE MG/DL
LEUKOCYTE ESTERASE UR QL STRIP: NEGATIVE
NITRATE UR QL: NEGATIVE
PH UR STRIP: 6.5 PH (ref 5–7)
SOURCE: NORMAL
SP GR UR STRIP: 1.01 (ref 1–1.03)
UROBILINOGEN UR STRIP-ACNC: 0.2 EU/DL (ref 0.2–1)

## 2019-10-17 PROCEDURE — 87086 URINE CULTURE/COLONY COUNT: CPT | Performed by: UROLOGY

## 2019-10-17 PROCEDURE — 81003 URINALYSIS AUTO W/O SCOPE: CPT | Performed by: UROLOGY

## 2019-10-18 LAB
BACTERIA SPEC CULT: NO GROWTH
Lab: NORMAL
SPECIMEN SOURCE: NORMAL

## 2019-11-01 ENCOUNTER — TELEPHONE (OUTPATIENT)
Dept: UROLOGY | Facility: CLINIC | Age: 84
End: 2019-11-01

## 2019-11-01 NOTE — TELEPHONE ENCOUNTER
Called patient and informed him that results showed that he had no infection.     Candelaria Turk LPN

## 2019-11-01 NOTE — TELEPHONE ENCOUNTER
[facility] Call Center    Phone Message    May a detailed message be left on voicemail: yes    Reason for Call: Other: Pt called and has not heard back from anyone regarding his urine sample.  Please call the pt back to discuss the results. Thanks     Action Taken: Message routed to:  [facility] urology clinic

## 2020-03-03 ENCOUNTER — TELEPHONE (OUTPATIENT)
Dept: UROLOGY | Facility: CLINIC | Age: 85
End: 2020-03-03

## 2020-03-03 NOTE — TELEPHONE ENCOUNTER
Called Jenn back and gave her the contact information for Health Information Management.   SEKOU Muñiz RN      [facility] Call Center    Phone Message    May a detailed message be left on voicemail: yes     Reason for Call: Tierra [facility] in [facility] called and requested pt's clinical notes for 9/25/19 and 9/26/18 visit. Please fax the notes to [phone]. Please call back Tierra for any further questions. Thanks.    Action Taken: Message routed to:  [facility] URO    Travel Screening: Not Applicable

## 2020-03-05 ENCOUNTER — TRANSFERRED RECORDS (OUTPATIENT)
Dept: HEALTH INFORMATION MANAGEMENT | Facility: CLINIC | Age: 85
End: 2020-03-05

## 2020-03-06 ENCOUNTER — TRANSFERRED RECORDS (OUTPATIENT)
Dept: HEALTH INFORMATION MANAGEMENT | Facility: CLINIC | Age: 85
End: 2020-03-06

## 2020-03-26 ENCOUNTER — TRANSFERRED RECORDS (OUTPATIENT)
Dept: HEALTH INFORMATION MANAGEMENT | Facility: CLINIC | Age: 85
End: 2020-03-26

## 2020-04-16 ENCOUNTER — TELEPHONE (OUTPATIENT)
Dept: UROLOGY | Facility: CLINIC | Age: 85
End: 2020-04-16

## 2020-04-16 NOTE — TELEPHONE ENCOUNTER
Spoke to patient. He will most likely have cysto in [place] as he is bleeding and in [place].Columba Corbett LPN

## 2020-04-16 NOTE — TELEPHONE ENCOUNTER
[facility] Call Center    Phone Message    May a detailed message be left on voicemail: yes     Reason for Call: Other: Patient has some questions about stoppping the ELIQUISTE before he has the CYSTOSCOPY? Please call he has a few questions     Action Taken: Other: [facility] Urology    Travel Screening: Not Applicable

## 2020-04-17 ENCOUNTER — TRANSFERRED RECORDS (OUTPATIENT)
Dept: HEALTH INFORMATION MANAGEMENT | Facility: CLINIC | Age: 85
End: 2020-04-17

## 2020-04-20 ENCOUNTER — TRANSFERRED RECORDS (OUTPATIENT)
Dept: HEALTH INFORMATION MANAGEMENT | Facility: CLINIC | Age: 85
End: 2020-04-20

## 2020-04-29 ENCOUNTER — TELEPHONE (OUTPATIENT)
Dept: UROLOGY | Facility: CLINIC | Age: 85
End: 2020-04-29

## 2020-04-29 NOTE — TELEPHONE ENCOUNTER
Mio is calling  And a scope in [place]  Or gross hematuria . He said they found some scar tissue  By the prostate and cleared it ou so he can pass the catheter easier. He is now able to pass the catheter with ease. Last night he had some blood in the urine after cathing. This am it is clearing . He is on Eliquist for afib. Suggested he drink  Water  Call me this afternoon to update the color of urine.  Need records  From [place].Columba Corbett LPN

## 2020-04-30 NOTE — TELEPHONE ENCOUNTER
He called his urine has cleared . He will continue to monitor Patient will call when needed check with Dr Iverson on when he needs to see him.Columba Corbett LPN

## 2020-05-05 ENCOUNTER — TELEPHONE (OUTPATIENT)
Dept: UROLOGY | Facility: CLINIC | Age: 85
End: 2020-05-05

## 2020-05-05 NOTE — TELEPHONE ENCOUNTER
Spoke with patient and did pre-visit covid-19 screening. Also informed him to come to appointment on time, alone, wear a mask, and that he'll need to leave a urine sample. He said he understood.

## 2020-05-06 ENCOUNTER — VIRTUAL VISIT (OUTPATIENT)
Dept: UROLOGY | Facility: CLINIC | Age: 85
End: 2020-05-06
Payer: MEDICARE

## 2020-05-06 VITALS — BODY MASS INDEX: 23.52 KG/M2 | HEIGHT: 71 IN | WEIGHT: 168 LBS

## 2020-05-06 DIAGNOSIS — R31.0 GROSS HEMATURIA: Primary | ICD-10-CM

## 2020-05-06 PROCEDURE — 99442 ZZC PHYSICIAN TELEPHONE EVALUATION 11-20 MIN: CPT | Performed by: UROLOGY

## 2020-05-06 RX ORDER — METOPROLOL SUCCINATE 25 MG/1
25 TABLET, EXTENDED RELEASE ORAL
COMMUNITY
Start: 2019-10-07

## 2020-05-06 RX ORDER — ASPIRIN 81 MG/1
81 TABLET, CHEWABLE ORAL DAILY
COMMUNITY

## 2020-05-06 ASSESSMENT — MIFFLIN-ST. JEOR: SCORE: 1429.17

## 2020-05-06 NOTE — PROGRESS NOTES
"Mio Delacruz is a 89 year old male who is being evaluated via a billable telephone visit.      The patient has been notified of following:     \"This telephone visit will be conducted via a call between you and your physician/provider. We have found that certain health care needs can be provided without the need for a physical exam.  This service lets us provide the care you need with a short phone conversation.  If a prescription is necessary we can send it directly to your pharmacy.  If lab work is needed we can place an order for that and you can then stop by our lab to have the test done at a later time.    Telephone visits are billed at different rates depending on your insurance coverage. During this emergency period, for some insurers they may be billed the same as an in-person visit.  Please reach out to your insurance provider with any questions.    If during the course of the call the physician/provider feels a telephone visit is not appropriate, you will not be charged for this service.\"    Patient has given verbal consent for Telephone visit?  Yes    What phone number would you like to be contacted at?[phone]    Notes: The patient is a 89-year-old male who was treated with seed implants years ago and has a low PSA on follow-up.  He also was diagnosed with superficial low-grade bladder cancer in 2017.  He had a CT scan and recent cystoscopy in April while in [place].  We have the cystoscopy note which revealed no stones or bladder tumors and only some inflammation on the bladder.  His CT scan apparently was normal-we will obtain the report soon  Other past medical history, medications and allergies reviewed.  He has discontinued the Eliquis and now is on a baby aspirin daily.  Review of systems: He is catheterizing every morning and every evening at bedtime.  Exam: He seems alert and oriented.  He is having no trouble passing his Magic 3 catheters  Assessment: History of bladder and prostate " cancer with hematuria due to Eliquis and self cathing.  Plan: Office cystoscopy, PSA in 6 months.  Continue intermittent self-catheterization twice daily.  Stop baby aspirin if more hematuria.    How would you like to obtain your AVS? Mail a copy    Phone call duration: 15 minutes    WM Zayra MD

## (undated) DEVICE — PREP SCRUB CARE CHLOROXYLENOL (PCMX) 4OZ 29902-004

## (undated) DEVICE — ESU GROUND PAD ADULT W/CORD E7507

## (undated) DEVICE — DRSG TELFA 2X3"

## (undated) DEVICE — COVER FOOTSWITCH W/CINCH 20X24" 923267

## (undated) DEVICE — LINEN FULL SHEET 5511

## (undated) DEVICE — TUBING IRRIG TUR Y TYPE 96" LF 6543-01

## (undated) DEVICE — SOL WATER IRRIG 3000ML BAG 2B7117

## (undated) DEVICE — NDL 18GA 1.5" 305196

## (undated) DEVICE — SOL WATER IRRIG 1000ML BOTTLE 2F7114

## (undated) DEVICE — BAG CLEAR TRASH 1.3M 39X33" P4040C

## (undated) DEVICE — GLOVE PROTEXIS POWDER FREE SMT 7.5  2D72PT75X

## (undated) DEVICE — PACK CYSTO CUSTOM RIDGES

## (undated) DEVICE — LINEN HALF SHEET 5512

## (undated) DEVICE — ESU CORD MONOPOLAR 10'  E0510

## (undated) RX ORDER — LIDOCAINE HYDROCHLORIDE 10 MG/ML
INJECTION, SOLUTION EPIDURAL; INFILTRATION; INTRACAUDAL; PERINEURAL
Status: DISPENSED
Start: 2017-09-07

## (undated) RX ORDER — LABETALOL HYDROCHLORIDE 5 MG/ML
INJECTION, SOLUTION INTRAVENOUS
Status: DISPENSED
Start: 2017-09-07

## (undated) RX ORDER — FENTANYL CITRATE 50 UG/ML
INJECTION, SOLUTION INTRAMUSCULAR; INTRAVENOUS
Status: DISPENSED
Start: 2017-09-07

## (undated) RX ORDER — PROPOFOL 10 MG/ML
INJECTION, EMULSION INTRAVENOUS
Status: DISPENSED
Start: 2017-09-07

## (undated) RX ORDER — HYDRALAZINE HYDROCHLORIDE 20 MG/ML
INJECTION INTRAMUSCULAR; INTRAVENOUS
Status: DISPENSED
Start: 2017-09-07

## (undated) RX ORDER — DEXAMETHASONE SODIUM PHOSPHATE 4 MG/ML
INJECTION, SOLUTION INTRA-ARTICULAR; INTRALESIONAL; INTRAMUSCULAR; INTRAVENOUS; SOFT TISSUE
Status: DISPENSED
Start: 2017-09-07

## (undated) RX ORDER — PHENYLEPHRINE HCL IN 0.9% NACL 1 MG/10 ML
SYRINGE (ML) INTRAVENOUS
Status: DISPENSED
Start: 2017-09-07

## (undated) RX ORDER — ONDANSETRON 2 MG/ML
INJECTION INTRAMUSCULAR; INTRAVENOUS
Status: DISPENSED
Start: 2017-09-07